# Patient Record
Sex: MALE | Race: WHITE | Employment: OTHER | URBAN - METROPOLITAN AREA
[De-identification: names, ages, dates, MRNs, and addresses within clinical notes are randomized per-mention and may not be internally consistent; named-entity substitution may affect disease eponyms.]

---

## 2020-07-20 ENCOUNTER — OFFICE VISIT (OUTPATIENT)
Dept: ORTHOPEDIC SURGERY | Age: 70
End: 2020-07-20
Payer: MEDICARE

## 2020-07-20 VITALS — HEIGHT: 69 IN | WEIGHT: 194 LBS | BODY MASS INDEX: 28.73 KG/M2

## 2020-07-20 PROCEDURE — 99203 OFFICE O/P NEW LOW 30 MIN: CPT | Performed by: PHYSICIAN ASSISTANT

## 2020-07-20 PROCEDURE — 4040F PNEUMOC VAC/ADMIN/RCVD: CPT | Performed by: PHYSICIAN ASSISTANT

## 2020-07-20 PROCEDURE — 3017F COLORECTAL CA SCREEN DOC REV: CPT | Performed by: PHYSICIAN ASSISTANT

## 2020-07-20 PROCEDURE — 1036F TOBACCO NON-USER: CPT | Performed by: PHYSICIAN ASSISTANT

## 2020-07-20 PROCEDURE — 1123F ACP DISCUSS/DSCN MKR DOCD: CPT | Performed by: PHYSICIAN ASSISTANT

## 2020-07-20 PROCEDURE — G8417 CALC BMI ABV UP PARAM F/U: HCPCS | Performed by: PHYSICIAN ASSISTANT

## 2020-07-20 PROCEDURE — G8427 DOCREV CUR MEDS BY ELIG CLIN: HCPCS | Performed by: PHYSICIAN ASSISTANT

## 2020-07-20 NOTE — PROGRESS NOTES
Signs: Ht 5' 8.5\" (1.74 m)   Wt 194 lb (88 kg)   BMI 29.07 kg/m²     General Exam:   Constitutional: Patient is adequately groomed with no evidence of malnutrition  DTRs: Deep tendon reflexes are intact  Mental Status: The patient is oriented to time, place and person. The patient's mood and affect are appropriate. Lymphatic: The lymphatic examination bilaterally reveals all areas to be without enlargement or induration. Vascular: Examination reveals no swelling or calf tenderness. Peripheral pulses are palpable and 2+. Neurological: The patient has good coordination. There is no weakness or sensory deficit. Body mass index is 29.07 kg/m². Right knee Examination:    Inspection:  No effusion but there is mild swelling, ecchymosis or deformity    Palpation:  Tenderness to palpation directly over the medial joint line but there is no palpable pain noted within the patellofemoral joint or lateral joint. Range of Motion:  Well-preserved range of motion, 0-120°. Strength: -5/5 quad and hamstring strength    Special Tests:  Positive Clayton's examination. Stable to varus and valgus stress testing. Negative Lachman's exam    Skin: There are no rashes, ulcerations or lesions. Gait: Minimal limp        Additional Examinations:         Contralateral Exam: Examination of the left knee reveals intact skin. There is no focal tenderness. The patient demonstrates full painless range of motion with regard to flexion and extension. Strength is 5/5 throughout all planes. Ligamentous stability is grossly intact. Examination of the right hip reveals intact skin. The patient demonstrates full painless range of motion with regards to flexion, abduction, internal and external rotation. There is no tenderness about the greater trochanter. There is a negative straight leg raise against resistance. Strength is 5/5 throughout all planes.     Radiology:     X-rays obtained and reviewed in office:  Views 4 views including AP weightbearing, PA flexed weightbearing, lateral, and skyline  Location right knee  Impression there is grade 2-3/4 osteoarthritis noted within the medial compartment of the right knee with grade 2/4 osteoarthritis noted within the patellofemoral joint with small peripheral osteophyte formation noted. Impression:  Encounter Diagnoses   Name Primary?  Right knee pain, unspecified chronicity Yes    Primary osteoarthritis of right knee        Office Procedures:  Orders Placed This Encounter   Procedures    XR KNEE RIGHT (MIN 4 VIEWS)     Standing Status:   Future     Number of Occurrences:   1     Standing Expiration Date:   7/17/2021    MRI KNEE RIGHT WO CONTRAST     Standing Status:   Future     Standing Expiration Date:   7/20/2021     Scheduling Instructions:      MRI RT KNEE W/O CONTRAST      MMT            SCHED:       Treatment Plan: We are going to obtain an MRI of the right knee to rule out medial meniscal tear and to evaluate his degree of osteoarthritis. If he has a significant meniscal tear we talked about arthroscopy. If there is minimal tear and its mainly arthritis he would be a candidate for Visco supplementation. We will follow-up after the MRI for review of the results.

## 2020-07-24 ENCOUNTER — HOSPITAL ENCOUNTER (OUTPATIENT)
Dept: MRI IMAGING | Age: 70
Discharge: HOME | End: 2020-07-24
Payer: MEDICARE

## 2020-07-24 PROCEDURE — 73721 MRI JNT OF LWR EXTRE W/O DYE: CPT

## 2020-07-30 ENCOUNTER — OFFICE VISIT (OUTPATIENT)
Dept: ORTHOPEDIC SURGERY | Age: 70
End: 2020-07-30
Payer: MEDICARE

## 2020-07-30 PROCEDURE — 4040F PNEUMOC VAC/ADMIN/RCVD: CPT | Performed by: ORTHOPAEDIC SURGERY

## 2020-07-30 PROCEDURE — G8417 CALC BMI ABV UP PARAM F/U: HCPCS | Performed by: ORTHOPAEDIC SURGERY

## 2020-07-30 PROCEDURE — 99214 OFFICE O/P EST MOD 30 MIN: CPT | Performed by: ORTHOPAEDIC SURGERY

## 2020-07-30 PROCEDURE — 1036F TOBACCO NON-USER: CPT | Performed by: ORTHOPAEDIC SURGERY

## 2020-07-30 PROCEDURE — 1123F ACP DISCUSS/DSCN MKR DOCD: CPT | Performed by: ORTHOPAEDIC SURGERY

## 2020-07-30 PROCEDURE — G8428 CUR MEDS NOT DOCUMENT: HCPCS | Performed by: ORTHOPAEDIC SURGERY

## 2020-07-30 PROCEDURE — 3017F COLORECTAL CA SCREEN DOC REV: CPT | Performed by: ORTHOPAEDIC SURGERY

## 2020-07-30 RX ORDER — BUPIVACAINE HYDROCHLORIDE 5 MG/ML
30 INJECTION, SOLUTION PERINEURAL ONCE
Status: COMPLETED | OUTPATIENT
Start: 2020-07-30 | End: 2020-07-30

## 2020-07-30 RX ORDER — BETAMETHASONE SODIUM PHOSPHATE AND BETAMETHASONE ACETATE 3; 3 MG/ML; MG/ML
12 INJECTION, SUSPENSION INTRA-ARTICULAR; INTRALESIONAL; INTRAMUSCULAR; SOFT TISSUE ONCE
Status: COMPLETED | OUTPATIENT
Start: 2020-07-30 | End: 2020-07-30

## 2020-07-30 RX ADMIN — BUPIVACAINE HYDROCHLORIDE 150 MG: 5 INJECTION, SOLUTION PERINEURAL at 08:46

## 2020-07-30 RX ADMIN — BETAMETHASONE SODIUM PHOSPHATE AND BETAMETHASONE ACETATE 12 MG: 3; 3 INJECTION, SUSPENSION INTRA-ARTICULAR; INTRALESIONAL; INTRAMUSCULAR; SOFT TISSUE at 08:46

## 2020-07-30 NOTE — LETTER
CONSENT TO SURGICAL OR MEDICAL PROCEDURE    PATIENTS NAMES: Brenda Mcnair 1950  79 y.o. [phone] (home)   DATE/TIME: 7/30/2020 8:49 AM    1) I consent that Dr. Corry Santos perform one or more surgical and or medical procedures on the above named patient at: [address] [place] [address]/[facility] to treat the condition(s) which appear indicated by the diagnostic studies already preformed. I have been told in general terms the nature and purpose of the procedure(s) and what the procedure(s) is/are expected to accomplish. They procedure(s) are as follows:   RT KNEE ARTHROSCOPY; PARTIAL MEDIAL MENISECTOMY; CHONDROPLAST; LATERAL WORK AS NEEDED    2) It has been explained to me by the informing physician that during the course of any surgical or medical procedure unforeseen condition(s) may be revealed that necessitate an extension of the original procedure(s) or a different procedure(s) than set forth in Paragraph 1. I therefore consent that the above named physician perform such additional or different procedure(s) as are necessary or desirable in the exercise of his professional judgement. 3) I have been made aware by the informing physician of certain reasonably known risks that are associated with the procedure(s) set forth in Paragraph 1.  I understand the reasonably known risk to be: Including but not limited to: CVA, infection, M.I., Phlebitis, Cardiac Arrest and Pulmonary Embolism, Loss of Circulation, Nerve Injury, Delayed Healing, Recurrence, Loss of extremity/digit, R.S.D., Screw breakage, Arthritis, Pain, Swelling, Stiffness, Failure of Prothesis, Fracture, Leg length discrepancy, Wound complication/non-healing, need for further surgery and persistent pain. 4) I have also been informed by the informing physician that there are other risks, from both known and unknown causes, that are attendant to the performance of any surgical or medical procedure(s). I am aware that the practice of surgery and medicine is not an exact science, and I acknowledge that no guarantees have been made to me concerning the results of the procedure(s). 5) I consent to the taking of photographs before, during and after the procedure(s) for scientific and educational purposes. I also understand that medical students and residents may participate in the procedure(s) set forth in Paragraph 1, and I consent to their participation under the supervision of the above named physician. 6) I consent to the administration of anesthesia and to the use of such anesthetics as may be deemed advisable by the anesthesiologist engaged to administer anesthesia.   7) I certify that I have read and understand this consent to the surgical or medical procedure(s); that all the information contained herein was disclosed to me by the informing physician prior to my signing; that all blanks or statements requiring insertions or completion were filled in and inapplicable paragraphs, if any, were stricken before I signed; and that all questions asked by me about the procedure(s) have been fully answered by the informing physician in a satisfactory manner.    ________________________________                           _______________________________  Signature of patient                                                                  Jennifer Arriaza M.D.  ________________________________                           ________________________________  Signature of Informing Physician                                           Informing Physician (Print)

## 2020-07-30 NOTE — PROGRESS NOTES
Chief Complaint    Results (mri rt knee)      History of Present Illness:  Neva Trihn is a 79 y.o. male who returns today for a follow-up evaluation regarding his right knee pain. He still continues report intermittent pain throughout his right knee. Typically tends to be worse with activity. We did do an MRI of the right knee is in today for the results. The results of the MRI do indicate that he has a complex tear in the posterior horn of the medial meniscus but he also additionally has moderate to severe osteoarthritis of the medial compartment. Additional arthritis is evident of the patellofemoral joint and lateral compartment. Pain Assessment  Location of Pain: Knee  Location Modifiers: Right  Severity of Pain: 1  Frequency of Pain: Intermittent  Aggravating Factors: Walking, Standing  Limiting Behavior: Some  Work-Related Injury: No  Are there other pain locations you wish to document?: No    Medical History:  Patient's medications, allergies, past medical, surgical, social and family histories were reviewed and updated as appropriate. Review of Systems:  Relevant review of systems reviewed and available in the patient's chart    Vital Signs: There were no vitals taken for this visit. General Exam:   Constitutional: Patient is adequately groomed with no evidence of malnutrition  DTRs: Deep tendon reflexes are intact  Mental Status: The patient is oriented to time, place and person. The patient's mood and affect are appropriate. Lymphatic: The lymphatic examination bilaterally reveals all areas to be without enlargement or induration. Vascular: Examination reveals no swelling or calf tenderness. Peripheral pulses are palpable and 2+. Neurological: The patient has good coordination. There is no weakness or sensory deficit. There is no height or weight on file to calculate BMI.     Right knee Examination:    Inspection:  No effusion but there is mild swelling, ecchymosis or deformity    Palpation:  Tenderness to palpation directly over the medial joint line but there is no palpable pain noted within the patellofemoral joint or lateral joint. Range of Motion:  Well-preserved range of motion, 0-120°. Strength: -5/5 quad and hamstring strength    Special Tests:  Positive Clayton's examination. Stable to varus and valgus stress testing. Negative Lachman's exam    Skin: There are no rashes, ulcerations or lesions. Gait: Minimal limp        Additional Examinations:         Contralateral Exam: Examination of the left knee reveals intact skin. There is no focal tenderness. The patient demonstrates full painless range of motion with regard to flexion and extension. Strength is 5/5 throughout all planes. Ligamentous stability is grossly intact. Radiology:     FINDINGS:    MENISCI: Lateral meniscus demonstrates normal morphology and signal    characteristics.  No lateral meniscus tear.         Complex tearing involving the superior articular surface in the posterior    horn and body of the medial meniscus.         CRUCIATE LIGAMENTS: Anterior and posterior cruciate ligaments appear intact.     ACL ganglion formation.         EXTENSOR MECHANISM: Distal quadriceps tendon, patellar tendon, and patellar    retinacula appear intact.         LATERAL COLLATERAL LIGAMENT COMPLEX: Popliteus muscle/tendon, iliotibial    band, lateral collateral ligament, and biceps femoris appear intact.         MEDIAL COLLATERAL LIGAMENT COMPLEX: Periligamentous edema with interstitial    increased T2 signal of the medial collateral ligament near the femoral    attachment.         KNEE JOINT: Moderate joint effusion.         Mild tricompartmental osteophyte spurring.         Osseous alignment is normal.         No acute fracture or dislocation.         Diffuse grade 3-4 medial compartment chondromalacia with underlying    subchondral reactive marrow changes primarily at the medial femoral condyle.      Grade 2 lateral compartment chondromalacia.         Diffuse grade 2-3 patellofemoral chondromalacia with multifocal partial and    full-thickness fissuring of the patellofemoral articular cartilage.         BONE MARROW: Bone marrow signal intensity within the remaining visualized    osseous structures otherwise grossly unremarkable.         SOFT TISSUES: Small ruptured Baker's cyst.  Mild-to-moderate edema in the    subcutaneous fat about the knee.  Visualized popliteal neurovascular bundle    grossly unremarkable.              Impression    1. Complex tear involving the superior articular surface in the posterior    horn and body of the medial meniscus. 2. Tricompartmental osteoarthrosis.  Moderate-to-severe medial compartment    chondromalacia with underlying subchondral reactive marrow change. Mild-to-moderate patellofemoral chondromalacia with superimposed multifocal    partial and full-thickness fissuring.  Mild lateral compartment    chondromalacia. 3. Small Baker's cyst.  Moderate joint effusion.  Mild-to-moderate edema in    the subcutaneous fat about the knee. 4. Grade 1-2 MCL sprain versus chronic low-grade interstitial tear and    reactive edema about the medial collateral ligament. 5. ACL ganglion formation. Impression:  Encounter Diagnoses   Name Primary?  Primary osteoarthritis of right knee Yes    Acute medial meniscal tear, initial encounter        Office Procedures:  No orders of the defined types were placed in this encounter. Procedure: Right knee cortisone injection  I discussed in detail the risks, benefits and complications of aninjection which included but are not limited to infection, skin reactions, hot swollen joint, and anaphylaxis with the patient. The patient verbalized understanding and gave informed consent for the right knee injection.  The patient's knee was slightly flexed with a bolster underneath the knee and the skin prepped using Betadine or normal  rehabilitation  that   is involved with weeks of physical therapy, exercise, and strengthening. The patient also realizes that even though the surgery, from a functional perspective, typically allows the patient to return to good function at about 6 weeks, that it often takes 6 months to completely rehabilitate from this operation. The patient also realizes that if there is an arthritic component to the symptoms, then they may still have some degree of arthritis pain.

## 2020-08-27 ENCOUNTER — VIRTUAL VISIT (OUTPATIENT)
Dept: ORTHOPEDIC SURGERY | Age: 70
End: 2020-08-27
Payer: MEDICARE

## 2020-08-27 PROCEDURE — G8417 CALC BMI ABV UP PARAM F/U: HCPCS | Performed by: PHYSICIAN ASSISTANT

## 2020-08-27 PROCEDURE — 4040F PNEUMOC VAC/ADMIN/RCVD: CPT | Performed by: PHYSICIAN ASSISTANT

## 2020-08-27 PROCEDURE — G8428 CUR MEDS NOT DOCUMENT: HCPCS | Performed by: PHYSICIAN ASSISTANT

## 2020-08-27 PROCEDURE — 99213 OFFICE O/P EST LOW 20 MIN: CPT | Performed by: PHYSICIAN ASSISTANT

## 2020-08-27 PROCEDURE — 1123F ACP DISCUSS/DSCN MKR DOCD: CPT | Performed by: PHYSICIAN ASSISTANT

## 2020-08-27 PROCEDURE — 3017F COLORECTAL CA SCREEN DOC REV: CPT | Performed by: PHYSICIAN ASSISTANT

## 2020-08-27 PROCEDURE — 1036F TOBACCO NON-USER: CPT | Performed by: PHYSICIAN ASSISTANT

## 2020-08-27 NOTE — PROGRESS NOTES
Narayan Olivera is a 79 y.o. male being evaluated by a Virtual Visit (video visit) encounter to address concerns as mentioned above. A caregiver was present when appropriate. Due to this being a TeleHealth encounter (During [facility]P-83 public health emergency), evaluation of the following organ systems was limited: Vitals/Constitutional/EENT/Resp/CV/GI//MS/Neuro/Skin/Heme-Lymph-Imm. Pursuant to the emergency declaration under the [address] [place] [address] and the [employer] Act, this Virtual Visit was conducted with patient's (and/or legal guardian's) consent, to reduce the patient's risk of exposure to COVID-19 and provide necessary medical care. The patient (and/or legal guardian) has also been advised to contact this office for worsening conditions or problems, and seek emergency medical treatment and/or call 911 if deemed necessary. Services were provided through a video synchronous discussion virtually to substitute for in-person clinic visit. Patient's location was at their home. --Beto Tobar PA-C on 8/27/2020 at 11:35 AM    Chief Complaint    Follow-up (RIGHT KNEE)      History of Present Illness:  Narayan Olivera is a 79 y.o. male presents for his virtual visit. Patient at his last visit was diagnosed with a right knee medial meniscus tear. He also had advanced arthritis concentrated over the medial compartment. We did give him a cortisone injection. He states that he has very little pain at this time and his swelling has improved. To date he is happy with his progress. He has not yet returned to normal activities. Patient does state that he does get some mild irritation of swelling with activities but it is improved by rest and use of ice.            Medical History:  Past Medical History:   Diagnosis Date    Arthritis      Patient Active Problem List    Diagnosis Date Noted    Primary localized osteoarthrosis, upper arm 02/09/2015    Pain in joint, upper arm 07/31/2014    Other orthopedic aftercare 09/27/2013    Sprain and strain of unspecified site of shoulder and upper arm 09/27/2013    Brachial neuritis or radiculitis 09/27/2013     Past Surgical History:   Procedure Laterality Date    COLONOSCOPY  1/6/2014    polyp    ELBOW ARTHROSCOPY      left    INGUINAL HERNIA REPAIR Left 12-3-14    KNEE ARTHROSCOPY      right    SHOULDER ARTHROSCOPY      right and left     Family History   Problem Relation Age of Onset    Cancer Mother         breast    Cancer Father         lung     Social History     Socioeconomic History    Marital status:      Spouse name: Not on file    Number of children: Not on file    Years of education: Not on file    Highest education level: Not on file   Occupational History    Not on file   Social Needs    Financial resource strain: Not on file    Food insecurity     Worry: Not on file     Inability: Not on file    Transportation needs     Medical: Not on file     Non-medical: Not on file   Tobacco Use    Smoking status: Never Smoker    Smokeless tobacco: Never Used   Substance and Sexual Activity    Alcohol use: Yes     Comment: 7/week    Drug use: Not on file    Sexual activity: Not on file   Lifestyle    Physical activity     Days per week: Not on file     Minutes per session: Not on file    Stress: Not on file   Relationships    Social connections     Talks on phone: Not on file     Gets together: Not on file     Attends Anabaptism service: Not on file     Active member of club or organization: Not on file     Attends meetings of clubs or organizations: Not on file     Relationship status: Not on file    Intimate partner violence     Fear of current or ex partner: Not on file     Emotionally abused: Not on file     Physically abused: Not on file     Forced sexual activity: Not on file   Other Topics Concern    Not on file arthritis and the various options that are involved with this. The patient understands that the treatments can vary from essentially doing nothing to a total joint replacement arthroplasty for arthritis. I then went on to describe the utilization of glucosamine and chondroitin sulfate as a joint nutrition product. We talked about the fact that this is essentially a joint vitamin with typically minimal side effects. We also talked about utilization of prescription over-the-counter anti-inflammatory medications as the next option. We also discussed the possibility of brace wear or orthotic wear if the patient has significant varus alignment. We then went on to discuss the possibility of Visco supplementation with hyaluronate products. We talked about the typical course of this type of treatment and the fact that often times in the treatment for significant arthritis, this is successful less than half the time. We also talked about the corticosteroid injections and the fact that this can give a brief window of relief, but does not cure the problem; in fact, the pain often has a rebound effect in 6-10 weeks after the steroid has worn off. We also discussed arthroscopy surgery in attempts to debride the joint, but the fact that this is relatively unreliable treatment in the face of significant arthritis. It can occasionally be used, particularly if there is significant meniscus pathology. Lastly we discussed total joint replacement arthroplasty as the final and definitive step in treatment of arthritis. Patient realizes the magnitude of this type of treatment as well as having voiced a general understanding to the duration of the prosthesis. The patient voiced understanding to these continuum of treatment options. Patient will return to normal activities. If his knee continues to do well he can follow-up on a as needed basis.   If his symptoms return he would be a good candidate for a right knee video arthroscopy with partial meniscectomy and chondroplasty to be followed by viscosupplementation injections.

## 2020-09-02 NOTE — PROGRESS NOTES
Michail Peoples    Age 79 y.o.    male    1950    MRN [id number]    9/11/2020  Arrival Time_____________  OR Time____________[address]     Procedure(s):  RIGHT KNEE VIDEO ARTHROSCOPY PARTIAL MEDIAL MENISCECTOMY CHONDROPLASTY LATERAL WORK AS NEEDED                      General    Surgeon(s):  Aldair Ross, MD       Phone [phone] ([place])     [address]  Cell Work  _________________________________________________________________  _________________________________________________________________  _________________________________________________________________  _________________________________________________________________  _________________________________________________________________      PCP _____________________________ Phone_________________       H&P__________________Bringing    Chart            Epic  DOS     Called_______  EKG__________________Bringing    Chart            Epic  DOS     Called_______  LAB__________________ Bringing    Chart            Epic  DOS     Called_______  Cardiac Clearance_______Bringing    Chart            Epic      DOS       Called_______    Cardiologist________________________ Phone___________________________      ? Hindu concerns / Waiver on Chart            PAT Communications_____________  ? Pre-op Instructions Given [place]          ______________________________  ? Directions to Surgery Center                          ______________________________  ? Transportation Home_______________      _______________________________  ?  Crutches/Walker__________________        _______________________________      ________Pre-op Orders   _______Transcribed    _______Wt.  ________Pharmacy          _______SCD  ______VTE     ______Beta Blocker  ________Consent             ________TED Luis Spotted

## 2020-09-04 RX ORDER — ACETAMINOPHEN 160 MG
TABLET,DISINTEGRATING ORAL DAILY
COMMUNITY

## 2020-09-04 NOTE — PROGRESS NOTES
Obstructive Sleep Apnea (DAIJA) Screening     Patient:  Isreal Vee    YOB: 1950      Medical Record #:  [id number]                     Date:  9/4/2020     1. Are you a loud and/or regular snorer? []  Yes       [x] No    2. Have you been observed to gasp or stop breathing during sleep? []  Yes       [x] No    3. Do you feel tired or groggy upon awakening or do you awaken with a headache?           []  Yes       [x] No    4. Are you often tired or fatigued during the wake time hours? []  Yes       [x] No    5. Do you fall asleep sitting, reading, watching TV or driving? []  Yes       [x] No    6. Do you often have problems with memory or concentration? []  Yes       [x] No    **If patient's score is ? 3 they are considered high risk for DAIJA. An Anesthesia provider will evaluate the patient and develop a plan of care the day of surgery. Note:  If the patient's BMI is more than 35 kg m¯² , has neck circumference > 40 cm, and/or high blood pressure the risk is greater (© American Sleep Apnea Association, 2006).

## 2020-09-07 ENCOUNTER — OFFICE VISIT (OUTPATIENT)
Dept: PRIMARY CARE CLINIC | Age: 70
End: 2020-09-07
Payer: MEDICARE

## 2020-09-07 PROCEDURE — 99211 OFF/OP EST MAY X REQ PHY/QHP: CPT | Performed by: NURSE PRACTITIONER

## 2020-09-07 PROCEDURE — G8417 CALC BMI ABV UP PARAM F/U: HCPCS | Performed by: NURSE PRACTITIONER

## 2020-09-07 PROCEDURE — G8428 CUR MEDS NOT DOCUMENT: HCPCS | Performed by: NURSE PRACTITIONER

## 2020-09-07 NOTE — PATIENT INSTRUCTIONS
Advance Care Planning  People with COVID-19 may have no symptoms, mild symptoms, such as fever, cough, and shortness of breath or they may have more severe illness, developing severe and fatal pneumonia. As a result, Advance Care Planning with attention to naming a health care decision maker (someone you trust to make healthcare decisions for you if you could not speak for yourself) and sharing other health care preferences is important BEFORE a possible health crisis. Please contact your Primary Care Provider to discuss Advance Care Planning. Preventing the Spread of Coronavirus Disease 2019 in Homes and Residential Communities  For the most recent information go to [email or web address]    Prevention steps for People with confirmed or suspected COVID-19 (including persons under investigation) who do not need to be hospitalized  and   People with confirmed COVID-19 who were hospitalized and determined to be medically stable to go home    Your healthcare provider and public health staff will evaluate whether you can be cared for at home. If it is determined that you do not need to be hospitalized and can be isolated at home, you will be monitored by staff from your local or state health department. You should follow the prevention steps below until a healthcare provider or local or state health department says you can return to your normal activities. Stay home except to get medical care  People who are mildly ill with COVID-19 are able to isolate at home during their illness. You should restrict activities outside your home, except for getting medical care. Do not go to work, school, or public areas. Avoid using public transportation, ride-sharing, or taxis. Separate yourself from other people and animals in your home  People: As much as possible, you should stay in a specific room and away from other people in your home.  Also, you should use a separate before eating or preparing food. If soap and water are not readily available, use an alcohol-based hand  with at least 60% alcohol, covering all surfaces of your hands and rubbing them together until they feel dry. Soap and water are the best option if hands are visibly dirty. Avoid touching your eyes, nose, and mouth with unwashed hands. Avoid sharing personal household items  You should not share dishes, drinking glasses, cups, eating utensils, towels, or bedding with other people or pets in your home. After using these items, they should be washed thoroughly with soap and water. Clean all high-touch surfaces everyday  High touch surfaces include counters, tabletops, doorknobs, bathroom fixtures, toilets, phones, keyboards, tablets, and bedside tables. Also, clean any surfaces that may have blood, stool, or body fluids on them. Use a household cleaning spray or wipe, according to the label instructions. Labels contain instructions for safe and effective use of the cleaning product including precautions you should take when applying the product, such as wearing gloves and making sure you have good ventilation during use of the product. Monitor your symptoms  Seek prompt medical attention if your illness is worsening (e.g., difficulty breathing). Before seeking care, call your healthcare provider and tell them that you have, or are being evaluated for, COVID-19. Put on a facemask before you enter the facility. These steps will help the healthcare providers office to keep other people in the office or waiting room from getting infected or exposed. Ask your healthcare provider to call the local or state health department. Persons who are placed under active monitoring or facilitated self-monitoring should follow instructions provided by their local health department or occupational health professionals, as appropriate. When working with your local health department check their available hours.   If you have a medical emergency and need to call 911, notify the dispatch personnel that you have, or are being evaluated for COVID-19. If possible, put on a facemask before emergency medical services arrive. Discontinuing home isolation  Patients with confirmed COVID-19 should remain under home isolation precautions until the risk of secondary transmission to others is thought to be low. The decision to discontinue home isolation precautions should be made on a case-by-case basis, in consultation with healthcare providers and state and local health departments.

## 2020-09-07 NOTE — PROGRESS NOTES
Callie Martin received a viral test for COVID-19. They were educated on isolation and quarantine as appropriate. For any symptoms, they were directed to seek care from their PCP, given contact information to establish with a doctor, directed to an urgent care or the emergency room.

## 2020-09-08 LAB — SARS-COV-2, NAA: NOT DETECTED

## 2020-09-10 ENCOUNTER — ANESTHESIA EVENT (OUTPATIENT)
Dept: OPERATING ROOM | Age: 70
End: 2020-09-10
Payer: MEDICARE

## 2020-09-11 ENCOUNTER — ANESTHESIA (OUTPATIENT)
Dept: OPERATING ROOM | Age: 70
End: 2020-09-11
Payer: MEDICARE

## 2020-09-11 ENCOUNTER — HOSPITAL ENCOUNTER (OUTPATIENT)
Age: 70
Setting detail: OUTPATIENT SURGERY
Discharge: HOME | End: 2020-09-11
Attending: ORTHOPAEDIC SURGERY | Admitting: ORTHOPAEDIC SURGERY
Payer: MEDICARE

## 2020-09-11 VITALS
HEART RATE: 55 BPM | WEIGHT: 194 LBS | OXYGEN SATURATION: 97 % | SYSTOLIC BLOOD PRESSURE: 134 MMHG | TEMPERATURE: 97.6 F | DIASTOLIC BLOOD PRESSURE: 86 MMHG | HEIGHT: 68 IN | BODY MASS INDEX: 29.4 KG/M2 | RESPIRATION RATE: 16 BRPM

## 2020-09-11 VITALS
SYSTOLIC BLOOD PRESSURE: 88 MMHG | DIASTOLIC BLOOD PRESSURE: 52 MMHG | OXYGEN SATURATION: 97 % | RESPIRATION RATE: 2 BRPM

## 2020-09-11 PROCEDURE — 6360000002 HC RX W HCPCS: Performed by: NURSE ANESTHETIST, CERTIFIED REGISTERED

## 2020-09-11 PROCEDURE — 7100000000 HC PACU RECOVERY - FIRST 15 MIN: Performed by: ORTHOPAEDIC SURGERY

## 2020-09-11 PROCEDURE — 6360000002 HC RX W HCPCS: Performed by: ORTHOPAEDIC SURGERY

## 2020-09-11 PROCEDURE — 7100000001 HC PACU RECOVERY - ADDTL 15 MIN: Performed by: ORTHOPAEDIC SURGERY

## 2020-09-11 PROCEDURE — 2500000003 HC RX 250 WO HCPCS: Performed by: ORTHOPAEDIC SURGERY

## 2020-09-11 PROCEDURE — 6370000000 HC RX 637 (ALT 250 FOR IP): Performed by: ANESTHESIOLOGY

## 2020-09-11 PROCEDURE — 3700000001 HC ADD 15 MINUTES (ANESTHESIA): Performed by: ORTHOPAEDIC SURGERY

## 2020-09-11 PROCEDURE — 7100000010 HC PHASE II RECOVERY - FIRST 15 MIN: Performed by: ORTHOPAEDIC SURGERY

## 2020-09-11 PROCEDURE — 2580000003 HC RX 258: Performed by: ORTHOPAEDIC SURGERY

## 2020-09-11 PROCEDURE — 2720000010 HC SURG SUPPLY STERILE: Performed by: ORTHOPAEDIC SURGERY

## 2020-09-11 PROCEDURE — 2500000003 HC RX 250 WO HCPCS: Performed by: NURSE ANESTHETIST, CERTIFIED REGISTERED

## 2020-09-11 PROCEDURE — 3600000014 HC SURGERY LEVEL 4 ADDTL 15MIN: Performed by: ORTHOPAEDIC SURGERY

## 2020-09-11 PROCEDURE — 3600000004 HC SURGERY LEVEL 4 BASE: Performed by: ORTHOPAEDIC SURGERY

## 2020-09-11 PROCEDURE — 3700000000 HC ANESTHESIA ATTENDED CARE: Performed by: ORTHOPAEDIC SURGERY

## 2020-09-11 PROCEDURE — 7100000011 HC PHASE II RECOVERY - ADDTL 15 MIN: Performed by: ORTHOPAEDIC SURGERY

## 2020-09-11 PROCEDURE — 2580000003 HC RX 258: Performed by: ANESTHESIOLOGY

## 2020-09-11 PROCEDURE — 2709999900 HC NON-CHARGEABLE SUPPLY: Performed by: ORTHOPAEDIC SURGERY

## 2020-09-11 RX ORDER — HYDRALAZINE HYDROCHLORIDE 20 MG/ML
5 INJECTION INTRAMUSCULAR; INTRAVENOUS EVERY 10 MIN PRN
Status: DISCONTINUED | OUTPATIENT
Start: 2020-09-11 | End: 2020-09-11 | Stop reason: HOSPADM

## 2020-09-11 RX ORDER — LIDOCAINE HYDROCHLORIDE 10 MG/ML
INJECTION, SOLUTION INFILTRATION; PERINEURAL PRN
Status: DISCONTINUED | OUTPATIENT
Start: 2020-09-11 | End: 2020-09-11 | Stop reason: SDUPTHER

## 2020-09-11 RX ORDER — MEPERIDINE HYDROCHLORIDE 50 MG/ML
12.5 INJECTION INTRAMUSCULAR; INTRAVENOUS; SUBCUTANEOUS EVERY 5 MIN PRN
Status: DISCONTINUED | OUTPATIENT
Start: 2020-09-11 | End: 2020-09-11 | Stop reason: HOSPADM

## 2020-09-11 RX ORDER — SODIUM CHLORIDE, SODIUM LACTATE, POTASSIUM CHLORIDE, CALCIUM CHLORIDE 600; 310; 30; 20 MG/100ML; MG/100ML; MG/100ML; MG/100ML
INJECTION, SOLUTION INTRAVENOUS CONTINUOUS
Status: DISCONTINUED | OUTPATIENT
Start: 2020-09-11 | End: 2020-09-11 | Stop reason: HOSPADM

## 2020-09-11 RX ORDER — MORPHINE SULFATE 2 MG/ML
2 INJECTION, SOLUTION INTRAMUSCULAR; INTRAVENOUS EVERY 5 MIN PRN
Status: DISCONTINUED | OUTPATIENT
Start: 2020-09-11 | End: 2020-09-11 | Stop reason: HOSPADM

## 2020-09-11 RX ORDER — LIDOCAINE HYDROCHLORIDE 10 MG/ML
0.3 INJECTION, SOLUTION EPIDURAL; INFILTRATION; INTRACAUDAL; PERINEURAL
Status: DISCONTINUED | OUTPATIENT
Start: 2020-09-11 | End: 2020-09-11 | Stop reason: HOSPADM

## 2020-09-11 RX ORDER — OXYCODONE HYDROCHLORIDE AND ACETAMINOPHEN 5; 325 MG/1; MG/1
2 TABLET ORAL PRN
Status: COMPLETED | OUTPATIENT
Start: 2020-09-11 | End: 2020-09-11

## 2020-09-11 RX ORDER — SODIUM CHLORIDE, SODIUM LACTATE, POTASSIUM CHLORIDE, AND CALCIUM CHLORIDE .6; .31; .03; .02 G/100ML; G/100ML; G/100ML; G/100ML
IRRIGANT IRRIGATION PRN
Status: DISCONTINUED | OUTPATIENT
Start: 2020-09-11 | End: 2020-09-11 | Stop reason: ALTCHOICE

## 2020-09-11 RX ORDER — SODIUM CHLORIDE 0.9 % (FLUSH) 0.9 %
10 SYRINGE (ML) INJECTION PRN
Status: DISCONTINUED | OUTPATIENT
Start: 2020-09-11 | End: 2020-09-11 | Stop reason: HOSPADM

## 2020-09-11 RX ORDER — OXYCODONE HYDROCHLORIDE AND ACETAMINOPHEN 5; 325 MG/1; MG/1
1 TABLET ORAL EVERY 6 HOURS PRN
Qty: 10 TABLET | Refills: 0 | Status: SHIPPED | OUTPATIENT
Start: 2020-09-11 | End: 2020-09-18

## 2020-09-11 RX ORDER — ONDANSETRON 2 MG/ML
INJECTION INTRAMUSCULAR; INTRAVENOUS PRN
Status: DISCONTINUED | OUTPATIENT
Start: 2020-09-11 | End: 2020-09-11 | Stop reason: SDUPTHER

## 2020-09-11 RX ORDER — MORPHINE SULFATE 2 MG/ML
1 INJECTION, SOLUTION INTRAMUSCULAR; INTRAVENOUS EVERY 5 MIN PRN
Status: DISCONTINUED | OUTPATIENT
Start: 2020-09-11 | End: 2020-09-11 | Stop reason: HOSPADM

## 2020-09-11 RX ORDER — SODIUM CHLORIDE 0.9 % (FLUSH) 0.9 %
10 SYRINGE (ML) INJECTION EVERY 12 HOURS SCHEDULED
Status: DISCONTINUED | OUTPATIENT
Start: 2020-09-11 | End: 2020-09-11 | Stop reason: HOSPADM

## 2020-09-11 RX ORDER — OXYCODONE HYDROCHLORIDE AND ACETAMINOPHEN 5; 325 MG/1; MG/1
1 TABLET ORAL PRN
Status: COMPLETED | OUTPATIENT
Start: 2020-09-11 | End: 2020-09-11

## 2020-09-11 RX ORDER — DEXAMETHASONE SODIUM PHOSPHATE 4 MG/ML
INJECTION, SOLUTION INTRA-ARTICULAR; INTRALESIONAL; INTRAMUSCULAR; INTRAVENOUS; SOFT TISSUE PRN
Status: DISCONTINUED | OUTPATIENT
Start: 2020-09-11 | End: 2020-09-11 | Stop reason: SDUPTHER

## 2020-09-11 RX ORDER — PROMETHAZINE HYDROCHLORIDE 25 MG/ML
6.25 INJECTION, SOLUTION INTRAMUSCULAR; INTRAVENOUS
Status: DISCONTINUED | OUTPATIENT
Start: 2020-09-11 | End: 2020-09-11 | Stop reason: HOSPADM

## 2020-09-11 RX ORDER — BUPIVACAINE HYDROCHLORIDE 2.5 MG/ML
INJECTION, SOLUTION INFILTRATION; PERINEURAL PRN
Status: DISCONTINUED | OUTPATIENT
Start: 2020-09-11 | End: 2020-09-11 | Stop reason: ALTCHOICE

## 2020-09-11 RX ORDER — LABETALOL HYDROCHLORIDE 5 MG/ML
5 INJECTION, SOLUTION INTRAVENOUS EVERY 10 MIN PRN
Status: DISCONTINUED | OUTPATIENT
Start: 2020-09-11 | End: 2020-09-11 | Stop reason: HOSPADM

## 2020-09-11 RX ORDER — ONDANSETRON 2 MG/ML
4 INJECTION INTRAMUSCULAR; INTRAVENOUS PRN
Status: DISCONTINUED | OUTPATIENT
Start: 2020-09-11 | End: 2020-09-11 | Stop reason: HOSPADM

## 2020-09-11 RX ORDER — PROPOFOL 10 MG/ML
INJECTION, EMULSION INTRAVENOUS PRN
Status: DISCONTINUED | OUTPATIENT
Start: 2020-09-11 | End: 2020-09-11 | Stop reason: SDUPTHER

## 2020-09-11 RX ORDER — FENTANYL CITRATE 50 UG/ML
INJECTION, SOLUTION INTRAMUSCULAR; INTRAVENOUS PRN
Status: DISCONTINUED | OUTPATIENT
Start: 2020-09-11 | End: 2020-09-11 | Stop reason: SDUPTHER

## 2020-09-11 RX ORDER — DIPHENHYDRAMINE HYDROCHLORIDE 50 MG/ML
12.5 INJECTION INTRAMUSCULAR; INTRAVENOUS
Status: DISCONTINUED | OUTPATIENT
Start: 2020-09-11 | End: 2020-09-11 | Stop reason: HOSPADM

## 2020-09-11 RX ADMIN — OXYCODONE HYDROCHLORIDE AND ACETAMINOPHEN 1 TABLET: 5; 325 TABLET ORAL at 11:37

## 2020-09-11 RX ADMIN — CEFAZOLIN SODIUM 2 G: 10 INJECTION, POWDER, FOR SOLUTION INTRAVENOUS at 10:37

## 2020-09-11 RX ADMIN — PROPOFOL 200 MG: 10 INJECTION, EMULSION INTRAVENOUS at 10:30

## 2020-09-11 RX ADMIN — FENTANYL CITRATE 50 MCG: 50 INJECTION INTRAMUSCULAR; INTRAVENOUS at 10:30

## 2020-09-11 RX ADMIN — DEXAMETHASONE SODIUM PHOSPHATE 8 MG: 4 INJECTION, SOLUTION INTRAMUSCULAR; INTRAVENOUS at 10:39

## 2020-09-11 RX ADMIN — ONDANSETRON 4 MG: 2 INJECTION INTRAMUSCULAR; INTRAVENOUS at 10:39

## 2020-09-11 RX ADMIN — LIDOCAINE HYDROCHLORIDE 40 MG: 10 INJECTION, SOLUTION INFILTRATION; PERINEURAL at 10:30

## 2020-09-11 RX ADMIN — FENTANYL CITRATE 50 MCG: 50 INJECTION INTRAMUSCULAR; INTRAVENOUS at 10:25

## 2020-09-11 RX ADMIN — SODIUM CHLORIDE, POTASSIUM CHLORIDE, SODIUM LACTATE AND CALCIUM CHLORIDE: 600; 310; 30; 20 INJECTION, SOLUTION INTRAVENOUS at 09:21

## 2020-09-11 RX ADMIN — FENTANYL CITRATE 50 MCG: 50 INJECTION INTRAMUSCULAR; INTRAVENOUS at 10:49

## 2020-09-11 ASSESSMENT — PULMONARY FUNCTION TESTS
PIF_VALUE: 4
PIF_VALUE: 11
PIF_VALUE: 2
PIF_VALUE: 3
PIF_VALUE: 11
PIF_VALUE: 3
PIF_VALUE: 3
PIF_VALUE: 0
PIF_VALUE: 3
PIF_VALUE: 2
PIF_VALUE: 2
PIF_VALUE: 1
PIF_VALUE: 10
PIF_VALUE: 3
PIF_VALUE: 10
PIF_VALUE: 2
PIF_VALUE: 2
PIF_VALUE: 1
PIF_VALUE: 2
PIF_VALUE: 3
PIF_VALUE: 2
PIF_VALUE: 10
PIF_VALUE: 2
PIF_VALUE: 10
PIF_VALUE: 3
PIF_VALUE: 0
PIF_VALUE: 3
PIF_VALUE: 11
PIF_VALUE: 3
PIF_VALUE: 2
PIF_VALUE: 3
PIF_VALUE: 2
PIF_VALUE: 3
PIF_VALUE: 5
PIF_VALUE: 2
PIF_VALUE: 2
PIF_VALUE: 1
PIF_VALUE: 2
PIF_VALUE: 3
PIF_VALUE: 15

## 2020-09-11 ASSESSMENT — PAIN - FUNCTIONAL ASSESSMENT: PAIN_FUNCTIONAL_ASSESSMENT: 0-10

## 2020-09-11 ASSESSMENT — PAIN SCALES - GENERAL
PAINLEVEL_OUTOF10: 0
PAINLEVEL_OUTOF10: 4
PAINLEVEL_OUTOF10: 0
PAINLEVEL_OUTOF10: 0

## 2020-09-11 ASSESSMENT — PAIN DESCRIPTION - LOCATION: LOCATION: KNEE

## 2020-09-11 ASSESSMENT — PAIN DESCRIPTION - PAIN TYPE: TYPE: SURGICAL PAIN

## 2020-09-11 ASSESSMENT — PAIN DESCRIPTION - DESCRIPTORS: DESCRIPTORS: ACHING

## 2020-09-11 ASSESSMENT — PAIN DESCRIPTION - ORIENTATION: ORIENTATION: RIGHT

## 2020-09-11 NOTE — BRIEF OP NOTE
Brief Postoperative Note      Patient: Laya Altamirano  YOB: 1950  MRN: [id number]    Date of Procedure: 9/11/2020    Pre-Op Diagnosis: RIGHT KNEE MEDIAL AND LATERAL MENISCUS TEARS; CHONDROMALACIA ; PLICA    Post-Op Diagnosis: Same       Procedure(s):  RIGHT KNEE VIDEO ARTHROSCOPY PARTIAL MEDIAL MENISCECTOMY CHONDROPLASTY, PILCA    Surgeon(s):  Jesus Parker MD    Assistant:  Sarah Hurtado MD    Anesthesia: General    Estimated Blood Loss (mL): Minimal    Complications: None    Specimens:   * No specimens in log *    Implants:  * No implants in log *      Drains: * No LDAs found *    Findings: AS ABOVE    Electronically signed by Jesus Parker MD on 9/11/2020 at 11:15 AM

## 2020-09-11 NOTE — ANESTHESIA POSTPROCEDURE EVALUATION
Department of Anesthesiology  Postprocedure Note    Patient: Brenda Mcnair  MRN: [id number]  YOB: 1950  Date of evaluation: 9/11/2020  Time:  1:27 PM     Procedure Summary     Date:  09/11/20 Room / Location:  [place] [address] / [facility]    Anesthesia Start:  1775 Anesthesia Stop:  2935    Procedure:  RIGHT KNEE VIDEO ARTHROSCOPY PARTIAL MEDIAL MENISCECTOMY CHONDROPLASTY, PILCA (Right Knee) Diagnosis:       Acute medial meniscus tear of right knee, initial encounter      (RIGHT KNEE MENISCUS TEAR)    Surgeon:  Corry Santos MD Responsible Provider:  Elle Reyes MD    Anesthesia Type:  general ASA Status:  2          Anesthesia Type: general    Simeon Phase I: Simeon Score: 9    Simeon Phase II: Simeon Score: 10    Last vitals: Reviewed and per EMR flowsheets.        Anesthesia Post Evaluation    Patient location during evaluation: PACU  Patient participation: complete - patient participated  Level of consciousness: awake and alert  Pain score: 0  Airway patency: patent  Nausea & Vomiting: no nausea and no vomiting  Complications: no  Cardiovascular status: blood pressure returned to baseline  Respiratory status: acceptable  Hydration status: stable

## 2020-09-11 NOTE — ANESTHESIA PRE PROCEDURE
Department of Anesthesiology  Preprocedure Note       Name:  Brenda Mcnair   Age:  79 y.o.  :  1950                                          MRN:  [id number]         Date:  2020      Surgeon: Sola Shaw):  Corry Santos MD    Procedure: Procedure(s):  RIGHT KNEE VIDEO ARTHROSCOPY PARTIAL MEDIAL MENISCECTOMY CHONDROPLASTY LATERAL WORK AS NEEDED    Medications prior to admission:   Prior to Admission medications    Medication Sig Start Date End Date Taking? Authorizing Provider   oxyCODONE-acetaminophen (PERCOCET) 5-325 MG per tablet Take 1 tablet by mouth every 6 hours as needed for Pain for up to 7 days. Intended supply: 7 days. Take lowest dose possible to manage pain 20 Yes Corry Santos MD   Cholecalciferol (VITAMIN D3) 50 MCG ( UT) CAPS Take by mouth daily 1-2 tabs   Yes Historical Provider, MD   multivitamin plus iron (ANIMAL SHAPES) 15 MG chewable tablet Take 1 tablet by mouth daily. Yes Historical Provider, MD   Misc Natural Products (GLUCOSAMINE-CHONDROITIN DS) TABS Take by mouth as needed     Historical Provider, MD   Omega-3 Fatty Acids (FISH OIL) 1200 MG CAPS Take by mouth as needed     Historical Provider, MD       Current medications:    Current Facility-Administered Medications   Medication Dose Route Frequency Provider Last Rate Last Dose    ceFAZolin (ANCEF) 2 g in dextrose 5 % 100 mL IVPB  2 g Intravenous On Call to [address], MD        lactated ringers infusion   Intravenous Continuous Sandra Fonseca  mL/hr at 20 0921      sodium chloride flush 0.9 % injection 10 mL  10 mL Intravenous 2 times per day Sandra Fonseca MD        sodium chloride flush 0.9 % injection 10 mL  10 mL Intravenous PRN Sandra Fonseca MD        lidocaine PF 1 % injection 0.3 mL  0.3 mL Intradermal Once PRN Sandra Fonseca MD           Allergies:     Allergies   Allergen Reactions    Demerol Hcl [Meperidine] Nausea Only       Problem List:    Patient Active Problem List Diagnosis Code    Other orthopedic aftercare Z47.89    Sprain and strain of unspecified site of shoulder and upper arm [id number]    Brachial neuritis or radiculitis M54.12    Pain in joint, upper arm M25.529    Primary localized osteoarthrosis, upper arm M19.029       Past Medical History:        Diagnosis Date    Arthritis        Past Surgical History:        Procedure Laterality Date    COLONOSCOPY  1/6/2014    polyp    ELBOW ARTHROSCOPY      left    INGUINAL HERNIA REPAIR Left 12-3-14    KNEE ARTHROSCOPY      right    SHOULDER ARTHROSCOPY      right and left       Social History:    Social History     Tobacco Use    Smoking status: Never Smoker    Smokeless tobacco: Never Used   Substance Use Topics    Alcohol use: Yes     Alcohol/week: 7.0 standard drinks     Types: 7 Glasses of wine per week                                Counseling given: Not Answered      Vital Signs (Current):   Vitals:    09/04/20 0911 09/11/20 0916   BP:  133/86   Pulse:  53   Resp:  16   Temp:  97 °F (36.1 °C)   SpO2:  98%   Weight: 194 lb (88 kg) 194 lb (88 kg)   Height: 5' 8.5\" (1.74 m) 5' 8\" (1.727 m)                                              BP Readings from Last 3 Encounters:   09/11/20 133/86   02/09/15 114/75   01/20/15 128/76       NPO Status: Time of last liquid consumption: 2300                        Time of last solid consumption: 2300                        Date of last liquid consumption: 09/10/20                        Date of last solid food consumption: 09/10/20    BMI:   Wt Readings from Last 3 Encounters:   09/11/20 194 lb (88 kg)   07/20/20 194 lb (88 kg)   02/09/15 195 lb (88.5 kg)     Body mass index is 29.5 kg/m².     CBC:   Lab Results   Component Value Date    WBC 8.0 12/03/2014    RBC 4.48 12/03/2014    HGB 14.2 12/03/2014    HCT 42.0 12/03/2014    MCV 93.7 12/03/2014    RDW 13.6 12/03/2014     12/03/2014       CMP:   Lab Results   Component Value Date     12/03/2014    K 3.6 12/03/2014     12/03/2014    CO2 24 12/03/2014    BUN 16 12/03/2014    CREATININE 0.9 12/03/2014    GFRAA >60 12/03/2014    GFRAA >60 08/21/2010    LABGLOM >60 12/03/2014    GLUCOSE 111 12/03/2014    PROT 7.5 08/20/2010    CALCIUM 8.5 12/03/2014    BILITOT 0.91 08/20/2010    ALKPHOS 56 08/20/2010    AST 33 08/20/2010    ALT 28 08/20/2010       POC Tests: No results for input(s): POCGLU, POCNA, POCK, POCCL, POCBUN, POCHEMO, POCHCT in the last 72 hours. Coags: No results found for: PROTIME, INR, APTT    HCG (If Applicable): No results found for: PREGTESTUR, PREGSERUM, HCG, HCGQUANT     ABGs: No results found for: PHART, PO2ART, XUT6QNO, LWC5OII, BEART, P7MDZWPQ     Type & Screen (If Applicable):  No results found for: LABABO, LABRH    Drug/Infectious Status (If Applicable):  No results found for: HIV, HEPCAB    COVID-19 Screening (If Applicable):   Lab Results   Component Value Date    COVID19 NOT DETECTED 09/07/2020         Anesthesia Evaluation  Patient summary reviewed and Nursing notes reviewed  Airway: Mallampati: I  TM distance: >3 FB   Neck ROM: full  Mouth opening: > = 3 FB Dental: normal exam         Pulmonary:Negative Pulmonary ROS and normal exam  breath sounds clear to auscultation                             Cardiovascular:Negative CV ROS            Rhythm: regular  Rate: normal                    Neuro/Psych:   Negative Neuro/Psych ROS              GI/Hepatic/Renal: Neg GI/Hepatic/Renal ROS            Endo/Other:    (+) : arthritis:., .                 Abdominal:           Vascular: negative vascular ROS. Anesthesia Plan      general     ASA 2       Induction: intravenous. MIPS: Postoperative opioids intended and Prophylactic antiemetics administered. Anesthetic plan and risks discussed with patient. Plan discussed with CRNA.                   Saleem Dexter MD   9/11/2020

## 2020-09-12 NOTE — OP NOTE
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 OPERATIONS PERFORMED:  Right knee diagnostic video arthroscopy,  arthroscopic partial medial meniscectomy, partial lateral meniscectomy,  chondroplasty of the patellofemoral joint medial and lateral  compartments, and excision of medial shelf plica. PRIMARY SURGEON:  Danika Shabazz MD    FIRST ASSISTANT:  Karl Treadwell MD    ANESTHESIA:  General.    ESTIMATED BLOOD LOSS:  Minimal.    COMPLICATIONS:  None apparent. POSTOPERATIVE CONDITION:  To the recovery room. SPECIMEN:  None. IMPLANTS:  None. DRAINS:  None. INDICATIONS FOR THE SURGERY:  The patient is a very pleasant 68-year-old  male, who has had a long history of right knee pain, refractory to  conservative management, affecting his activities of daily living. He  actually had acute on chronic pain. MRI proven medial meniscus tear as  well as tricompartmental osteoarthritis with focal cartilage defects. Risks, benefits, and alternatives of the surgery as well as limitations  of the surgery especially in light of preexisting chondral changes were  clearly discussed with the patient. The patient wished to proceed with  surgery.     DETAILS OF THE PROCEDURE:  The patient was brought to the operating  room. After administration of general anesthesia, he was placed on the  OR table in a standard arthroscopic position. The right lower extremity  was prepped and draped in a normal sterile fashion. Limb was  exsanguinated. Tourniquet was inflated to 200 mmHg. Standard  two-portal arthroscopy was performed with the following findings:    1. PATELLOFEMORAL JOINT:  Patellofemoral joint had grade 3 chondral  changes throughout the trochlear groove as well as the median ridge of  the patella, for which a chondroplasty was performed, debriding loose  cartilages back to a stable rim. Extensive chondroplasty was performed  in the trochlear groove as well as the median ridge of the patella. There was a large pathologic medial shelf plica, which was excoriating  the medial femoral condyle. This was excised using a 4.0 shaver. Any  bleeding vessels were coagulated using the ArthroCare radiofrequency  device. 2.  THE MEDIAL COMPARTMENT:  The medial compartment had an extensive  tearing of the medial meniscus going from the posterior horn all the way  to the midbody, for which a partial medial meniscectomy was performed  using a combination of baskets, 4.0 shaver as well as an ArthroCare  radiofrequency device, sculpting the meniscus tear back to a stable rim. The patient did have grade 3 and 4 chondral changes throughout the  medial femoral condyle, for which a chondroplasty was performed  debriding loose cartilages back to a stable rim. 3.  THE NOTCH:  The notch had an intact ACL and PCL with no evidence of  ligamentous instability. 4.  THE LATERAL COMPARTMENT:  The lateral compartment had anterior and  midbody lateral meniscus tear, for which a partial lateral meniscectomy  was performed using a combination of baskets, 4.0 shaver as well as an  ArthroCare radiofrequency device, sculpting the meniscus tear back to a  stable rim.   The patient did have a central focal cartilage defect on  the femoral condyle, for which a chondroplasty was performed, debriding  the loose cartilages back to a stable rim. The knee was then irrigated with copious amounts of irrigation solution. Portals were closed using a 4-0 Monocryl suture. Dry sterile  compression dressing was applied. The patient was then taken to the  recovery room in stable condition, having tolerated the procedure well.         Marie Nunez MD    D: 09/11/2020 11:21:54       T: 09/11/2020 14:43:36     /[email or web address]  Job#: [id number]     Doc#: [id number]    CC:  Jazmin Yanes

## 2020-09-15 ENCOUNTER — HOSPITAL ENCOUNTER (OUTPATIENT)
Dept: PHYSICAL THERAPY | Age: 70
Setting detail: THERAPIES SERIES
Discharge: HOME | End: 2020-09-15
Payer: MEDICARE

## 2020-09-15 PROCEDURE — 97161 PT EVAL LOW COMPLEX 20 MIN: CPT

## 2020-09-15 PROCEDURE — 97016 VASOPNEUMATIC DEVICE THERAPY: CPT

## 2020-09-15 PROCEDURE — 97110 THERAPEUTIC EXERCISES: CPT

## 2020-09-15 PROCEDURE — 97112 NEUROMUSCULAR REEDUCATION: CPT

## 2020-09-15 PROCEDURE — 97140 MANUAL THERAPY 1/> REGIONS: CPT

## 2020-09-15 NOTE — PLAN OF CARE
[FreeTextEntry1] : 40 year old male with PMH of DMII presenting to clinic for f/up visit\par \par DMII: Well controlled, now in prediabetic range \par -HGB A1C: 6.2% \par -Optho: Up to  date \par -Podiatry: referral given  \par -Diet and Exercise counselled\par -c/w Metformin \par \par Essential HTN: well Controlled\par -c/w Lisinopril \par \par Hyperlipemia \par -C/w Life style modification and Lipitor \par \par HCM\par -Flu vaccine given today\par -RTC in 3 month versus riding a cart), and skiing. He notes overall he is doing well since surgery. His most noted difficulty is the feeling of tightness above his knee in the front of his thigh with trying to bend his right knee. He also would like to be able to mow his lawn. Relevant Medical History: Osteoarthrits  Functional Disability Index (LEFS): 35%disability (52/80)    Height: 5'8\" Weight: 194lbs  Pain Scale: 1/10  Easing factors: rest  Provocative factors: Walking, squatting, kneeling. Type: []Constant   [x]Intermittent  []Radiating []Localized []other:     Numbness/Tingling: None    Occupation/School: Retired    Living Status/Prior Level of Function: Independent with ADLs and IADLs    OBJECTIVE:      Functional Mobility:    Gait: Inadequate knee extension in midstance and terminal stance on the right. ROM LEFT RIGHT   Knee ext 0 -6 // -2   Knee Flex 142 135                  Swelling           Mild right popliteal swelling. Moderate right suprapatellar swelling       Mild quadriceps activation deficit. Reflexes/Sensation: No sensory deficits reported   []Dermatomes/Myotomes intact    []Reflexes equal and normal bilaterally   []Other:    Joint mobility:    []Normal    [x]Hypo   []Hyper    Palpation: TTP of     Bandages/Dressings/Incisions: Surgical ports are clean, healing well, with no signs of infection. Patient notes he took the bandages off. [x] Patient history, allergies, meds reviewed. Medical chart reviewed. See intake form. Review Of Systems (ROS):  [x]Performed Review of systems (Integumentary, CardioPulmonary, Neurological) by intake and observation. Intake form has been scanned into medical record. Patient has been instructed to contact their primary care physician regarding ROS issues if not already being addressed at this time.       Co-morbidities/Complexities (which will affect course of rehabilitation):   []None           Arthritic conditions   []Rheumatoid endurance, and increase right LE strength in order to return to his prior level of function and activity without the onset of right knee pain. Functional Impairments:     [x]Noted lumbar/proximal hip/LE joint hypomobility   [x]Decreased LE functional ROM   [x]Decreased core/proximal hip strength and neuromuscular control   [x]Decreased LE functional strength   [x]Reduced balance/proprioceptive control   []other:      Functional Activity Limitations (from functional questionnaire and intake)   []Reduced ability to tolerate prolonged functional positions   []Reduced ability or difficulty with changes of positions or transfers between positions   []Reduced ability to maintain good posture and demonstrate good body mechanics with sitting, bending, and lifting   []Reduced ability to sleep   [] Reduced ability or tolerance with driving and/or computer work   []Reduced ability to perform lifting, carrying tasks   [x]Reduced ability to squat   []Reduced ability to forward bend   []Reduced ability to ambulate prolonged functional periods/distances/surfaces   [x]Reduced ability to ascend/descend stairs   [x]Reduced ability to run, hop, cut or jump   []other:    Participation Restrictions   []Reduced participation in self care activities   [x]Reduced participation in home management activities   []Reduced participation in work activities   []Reduced participation in social activities. [x]Reduced participation in sport/recreation activities. Classification :    [x]Signs/symptoms consistent with post-surgical status including decreased ROM, strength and function.    []Signs/symptoms consistent with joint sprain/strain   []Signs/symptoms consistent with patella-femoral syndrome   []Signs/symptoms consistent with knee OA/hip OA   []Signs/symptoms consistent with internal derangement of knee/Hip   []Signs/symptoms consistent with functional hip weakness/NMR control      []Signs/symptoms consistent with tendinitis/tendinosis    []signs/symptoms consistent with pathology which may benefit from Dry needling      []other:      Prognosis/Rehab Potential:      []Excellent   [x]Good    []Fair   []Poor    Tolerance of evaluation/treatment:    []Excellent   [x]Good    []Fair   []Poor  Physical Therapy Evaluation Complexity Justification  [x] A history of present problem with:  [] no personal factors and/or comorbidities that impact the plan of care;  [x]1-2 personal factors and/or comorbidities that impact the plan of care  []3 personal factors and/or comorbidities that impact the plan of care  [x] An examination of body systems using standardized tests and measures addressing any of the following: body structures and functions (impairments), activity limitations, and/or participation restrictions;:  [] a total of 1-2 or more elements   [x] a total of 3 or more elements   [] a total of 4 or more elements   [x] A clinical presentation with:  [x] stable and/or uncomplicated characteristics   [] evolving clinical presentation with changing characteristics  [] unstable and unpredictable characteristics;   [x] Clinical decision making of [x] low, [] moderate, [] high complexity using standardized patient assessment instrument and/or measurable assessment of functional outcome. [x] EVAL (LOW) 94740 (typically 20 minutes face-to-face)  [] EVAL (MOD) 05806 (typically 30 minutes face-to-face)  [] EVAL (HIGH) 83241 (typically 45 minutes face-to-face)  [] RE-EVAL       PLAN:   Frequency/Duration:  1-2x per week for 8 weeks (beginning 9/15/20, ending 11/10/20)  Interventions:  [x]  Therapeutic exercise including: strength training, ROM, for Lower extremity and core   [x]  NMR activation and proprioception for LE, Glutes and Core   [x]  Manual therapy as indicated for LE, Hip and spine to include: Dry Needling/IASTM, STM, PROM, Gr I-IV mobilizations, manipulation.    [x] Modalities as needed that may include: thermal agents, E-stim,

## 2020-09-15 NOTE — FLOWSHEET NOTE
[facility],  [address] [place]  Phone: [phone]  Fax [phone]    Physical Therapy Treatment Note/ Progress Report:           Date:  9/15/2020    Patient Name:  Ted Lester    :  1950  MRN: [id number]  Restrictions/Precautions:    Medical/Treatment Diagnosis Information:  Diagnosis: K04.190 (ICD-10-CM) - Complex tear of medial meniscus of right knee as current injury; S83.271 (ICD-10-CM) - Complex tear of lateral meniscus of right knee as current injury; A64.01 (CHOCO-23-MQ) - Plica syndrome of right knee; M17.11 (ICD-10-CM) - Primary osteoarthritis of right knee  Treatment Diagnosis: Right Knee Pain (M25.561); Right Knee Effusion (M25.461); Right Knee Stiffness (M25.661);  Right Lower Extremity Weakness (F21.76)  Insurance/Certification information:  PT Insurance Information: [facility]  Physician Information:  Referring Practitioner: Juan Antonio Bella MD  Has the plan of care been signed (Y/N):        []  Yes  [x]  No     Date of Patient follow up with Physician: 20 (virtual visit)      Is this a Progress Report:     []  Yes  [x]  No        If Yes:  Date Range for reporting period:  Beginnin/15/20  Ending:     Progress report will be due (10 Rx or 30 days whichever is less):        Recertification will be due (POC Duration  / 90 days whichever is less): 11/10/20       Visit # Insurance Allowable Requires auth   1 MED NEC    [x]no        []yes:     Functional Scale (LEFS):   9/15/20: % Disability (/80)     Practice Pattern I: Bony or Soft Tissue Surgery      Therapy Diagnosis/Practice Pattern: I      Number of Comorbidities:  []0     [x]1-2    []3+    Latex Allergy:  [x]NO      []YES  Preferred Language for Healthcare:   [x]English       []other:      Pain level:  1/10     SUBJECTIVE:  See eval    OBJECTIVE: See eval   Observation:    Test measurements:      RESTRICTIONS/PRECAUTIONS: WBAT    Exercises/Interventions:     Therapeutic Ex (86291) Volume Notes/CUES   Circuit (3x)  -Seated Hamstring Stretch  -Standing Gastroc Stretch   15x3\"  15x3\"                                                 Manual Intervention (43167) 9 min    STM to right Quadriceps, hamstring, and calf     Grade II-III inferior and superior glides fo the right patella                         NMR re-education (09050)  CUES NEEDED   [place]is[place] NMES quadsets + Heelside 6 min, 10\" on / 10\" off                                            Therapeutic Activity (20043)                                   Additional time was spent explaining exercise instruction, exercise set up, and rest breaks. Patient Education:   · Patient educated on examination findings, plan of care, and home exercise program. Patient received verbal instructions to immediately inform their attending clinician if they endured increased discomfort or perceived any component of their therapy to be counterproductive, so that their therapeutic parameters could be modified accordingly. Patient acknowledged these instructions and agreed to comply. · Do not swim for 10-14 days to full assure surgical ports are closed. Access Code: [email or web address]   URL: [email or web address]   Date: 09/15/2020   Prepared by: Israel Berg     Exercises  Long Sitting Quad Set - 1 reps - 1 sets - 3 sec hold - 6 minutes Time - 3x daily - 7x weekly  Sitting Heel Slide with Towel - 1 sets - 1 sec hold - 6 minutes Time - 3x daily - 7x weekly  Seated Table Hamstring Stretch - 15 reps - 3 sets - 3 sec Hold - 3x daily - 7x weekly  Gastroc Stretch on Wall - 15 reps - 3 sets - 3 sec hold - 3x daily - 7x weekly      Therapeutic Exercise and NMR EXR  [x] (91232) Provided verbal/tactile cueing for activities related to strengthening, flexibility, endurance, ROM for improvements in LE, proximal hip, and core control with self care, mobility, lifting, ambulation.  [] (61677) Provided verbal/tactile cueing for activities related to improving balance, coordination, kinesthetic sense, posture, motor skill, proprioception  to assist with LE, proximal hip, and core control in self care, mobility, lifting, ambulation and eccentric single leg control. NMR and Therapeutic Activities:    [x] (47032 or 43665) Provided verbal/tactile cueing for activities related to improving balance, coordination, kinesthetic sense, posture, motor skill, proprioception and motor activation to allow for proper function of core, proximal hip and LE with self care and ADLs  [] (07475) Gait Re-education- Provided training and instruction to the patient for proper LE, core and proximal hip recruitment and positioning and eccentric body weight control with ambulation re-education including up and down stairs     Home Exercise Program:    [x] (49880) Reviewed/Progressed HEP activities related to strengthening, flexibility, endurance, ROM of core, proximal hip and LE for functional self-care, mobility, lifting and ambulation/stair navigation   [] (61587)Reviewed/Progressed HEP activities related to improving balance, coordination, kinesthetic sense, posture, motor skill, proprioception of core, proximal hip and LE for self care, mobility, lifting, and ambulation/stair navigation      Manual Treatments:  PROM / STM / Oscillations-Mobs:  G-I, II, III, IV (PA's, Inf., Post.)  [x] (02572) Provided manual therapy to mobilize LE, proximal hip and/or LS spine soft tissue/joints for the purpose of modulating pain, promoting relaxation,  increasing ROM, reducing/eliminating soft tissue swelling/inflammation/restriction, improving soft tissue extensibility and allowing for proper ROM for normal function with self care, mobility, lifting and ambulation. Modalities:     [x] GAME READY (VASO)- for significant edema, swelling, pain control.      Charges:  Timed Code Treatment Minutes: 41   Total Treatment Minutes: 68     Time In: 4:01pm  Time Out: 5:09pm      [x] EVAL (LOW) 83051 (typically 20 minutes face-to-face)  [] EVAL (MOD) 28202 (typically 30 minutes face-to-face)  [] EVAL (HIGH) 68701 (typically 45 minutes face-to-face)  [] RE-EVAL     [x] PP(54405) x   1  [] IONTO  [x] NMR (47727) x 1    [x] VASO  [x] Manual (46166) x 1     [] Other:  [] TA x      [] Mech Traction (71885)  [] ES(attended) (81623)      [] ES (un) (99640):       GOALS:  Patient stated goal: Patient will be able to walk and play 9 holes of gold without restriction or the onset of right knee pain. [] Progressing: [] Met: [] Not Met: [] Adjusted    Therapist goals for Patient:   Short Term Goals: To be achieved in: 2 weeks  1. Independent in HEP and progression per patient tolerance, in order to prevent re-injury. [] Progressing: [] Met: [] Not Met: [] Adjusted  2. Patient will have a decrease in pain to facilitate improvement in movement, function, and ADLs as indicated by Functional Deficits. [] Progressing: [] Met: [] Not Met: [] Adjusted    Long Term Goals: To be achieved in:  weeks  1. Disability index score of 18% or less for the LEFS to assist with reaching prior level of function. [] Progressing: [] Met: [] Not Met: [] Adjusted  2. Patient will demonstrate ROM on affected side equal to the unaffected side to allow for proper joint functioning as indicated by patients Functional Deficits. [] Progressing: [] Met: [] Not Met: [] Adjusted  3. Patient will demonstrate an increase in Strength to good proximal hip strength and control, within 5lb HHD in LE to allow for proper functional mobility as indicated by patients Functional Deficits. [] Progressing: [] Met: [] Not Met: [] Adjusted  4. Patient will be able to mow the lawn without increased symptoms or restriction. [] Progressing: [] Met: [] Not Met: [] Adjusted  5. Patient will be able to ride his bike for 30 minutes without restriction or the onset of right knee pain.    [] Progressing: [] Met: [] Not Met: [] Adjusted    Progression Towards Functional goals:  [] Patient is progressing as expected towards functional goals listed. [] Progression is slowed due to complexities listed. [] Progression has been slowed due to co-morbidities. [x] Plan just implemented, too soon to assess goals progression  [] Other:         Overall Progression Towards Functional goals/ Treatment Progress Update:  [] Patient is progressing as expected towards functional goals listed. [] Progression is slowed due to complexities/Impairments listed. [] Progression has been slowed due to co-morbidities. [x] Plan just implemented, too soon to assess goals progression <30days   [] Goals require adjustment due to lack of progress  [] Patient is not progressing as expected and requires additional follow up with physician  [] Other    Prognosis for POC: [x] Good [] Fair  [] Poor      Patient requires continued skilled intervention: [x] Yes  [] No    Treatment/Activity Tolerance:  [x] Patient able to complete treatment  [] Patient limited by fatigue  [] Patient limited by pain    [] Patient limited by other medical complications  [] Other:     ASSESSMENT: Patient is a 79 y.o male s/p right medial menisectomy, lateral menisectomy, patellofemoral chondroplasty, and medial shelf plicectomy on 7/74/04. He would benefit from skilled PT intervention to decrease right knee swelling, improve right knee ROM, increase right LE muscular endurance, and increase right LE strength in order to return to his prior level of function and activity without the onset of right knee pain.     Return to Play: (if applicable)   []  Stage 1: Intro to Strength   []  Stage 2: Return to Run and Strength   []  Stage 3: Return to Jump and Strength   []  Stage 4: Dynamic Strength and Agility   []  Stage 5: Sport Specific Training     []  Ready to Return to Play, Meets All Above Stages   []  Not Ready for Return to Sports   Comments:                               PLAN: 1-2x per week for 8 weeks (beginning 9/15/20, ending 11/10/20)  [] Continue per plan of care [] Alter current plan (see comments above)  [x] Plan of care initiated [] Hold pending MD visit [] Discharge      Electronically signed by:  Pablo Marion PT, DPT ([place] PT License #: [id number])    Note: If patient does not return for scheduled/ recommended follow up visits, this note will serve as a discharge from care along with most recent update on progress.

## 2020-09-17 ENCOUNTER — VIRTUAL VISIT (OUTPATIENT)
Dept: ORTHOPEDIC SURGERY | Age: 70
End: 2020-09-17

## 2020-09-17 PROCEDURE — 99024 POSTOP FOLLOW-UP VISIT: CPT | Performed by: PHYSICIAN ASSISTANT

## 2020-09-17 NOTE — PROGRESS NOTES
Keny Knox is a 79 y.o. male being evaluated by a Virtual Visit (video visit) encounter to address concerns as mentioned above. A caregiver was present when appropriate. Due to this being a TeleHealth encounter (During AAIVX-21 public health emergency), evaluation of the following organ systems was limited: Vitals/Constitutional/EENT/Resp/CV/GI//MS/Neuro/Skin/Heme-Lymph-Imm. Pursuant to the emergency declaration under the [address] [place] [address] and the [employer] Act, this Virtual Visit was conducted with patient's (and/or legal guardian's) consent, to reduce the patient's risk of exposure to COVID-19 and provide necessary medical care. The patient (and/or legal guardian) has also been advised to contact this office for worsening conditions or problems, and seek emergency medical treatment and/or call 911 if deemed necessary. Services were provided through a video synchronous discussion virtually to substitute for in-person clinic visit. Patient's location was at their home. --Carmella Saldana PA-C on 9/17/2020 at 11:06 AM    An electronic signature was used to authenticate this note. History of present illness: The patient returns today for their first postoperative visit after knee arthroscopy. Pain control has been satisfactory with oral medications. There have been no fevers or chills. Patient is doing very well. He denies any significant pain. He states he only gets achy discomfort at times. He is 6 days status post right knee partial medial meniscectomy, partial lateral meniscectomy, chondroplasty, and plical band excision. Physical examination: Inspection reveals expected swelling. Incisions are clean, dry, and intact. No signs of infection. Range of motion limited by pain and swelling. There is no calf pain or signs of DVT with a negative Homans sign. Assessment/plan:  The patient is doing well after knee arthroscopy. Surgical findings were reviewed today. I have recommended ice, judicious use of the NSAIDs with GI precautions, and physical therapy to diminish swelling and restore both range of motion and strength. We will see the patient back in 3-4 weeks if needed. The patient verbalized good understanding of the plan.

## 2020-09-18 ENCOUNTER — HOSPITAL ENCOUNTER (OUTPATIENT)
Dept: PHYSICAL THERAPY | Age: 70
Setting detail: THERAPIES SERIES
Discharge: HOME | End: 2020-09-18
Payer: MEDICARE

## 2020-09-18 PROCEDURE — 97112 NEUROMUSCULAR REEDUCATION: CPT

## 2020-09-18 PROCEDURE — 97110 THERAPEUTIC EXERCISES: CPT

## 2020-09-18 PROCEDURE — 97140 MANUAL THERAPY 1/> REGIONS: CPT

## 2020-09-18 NOTE — FLOWSHEET NOTE
complaints. OBJECTIVE:    LRight knee AROM: -3-142 // -1 knee extension    RESTRICTIONS/PRECAUTIONS: WBAT    Exercises/Interventions:     Therapeutic Ex (37163) Volume Notes/CUES   Leg Press 5 min, 60#    Circuit (1x)  -Front Plank  -[address]   30\"  12x bilaterally                                                 Manual Intervention (64039) 8 min    STM to right hamstring and calf     Grade II-III superior glides fo the right patella                         NMR re-education (37216)  CUES NEEDED   [place] NMES quadsets + Heelside 6 min, 10\" on / 10\" off    Circuit (1x):  -Forward Step onto BOSU  -Lateral Step onto BOSU   12x  12x                                       Therapeutic Activity (48870)                                   Additional time was spent explaining exercise instruction, exercise set up, and rest breaks. Patient Education:   · HEP updated; provided new handout. · Do not swim for 10-14 days to full assure surgical ports are closed. Access Code: [email or web address]   URL: [email or web address]   Date: 09/18/2020   Prepared by: Mindy Sinclair     Exercises  Long Sitting Quad Set - 1 reps - 1 sets - 3 sec hold - 6 minutes Time - 3x daily - 7x weekly  Sitting Heel Slide with Towel - 1 sets - 1 sec hold - 6 minutes Time - 3x daily - 7x weekly  Standard Plank - 1 reps - 3 sets - 30 sec hold - 1x daily - 7x weekly  Side Plank with Clam - 12 reps - 3 sets - 1x daily - 7x weekly  Seated Table Hamstring Stretch - 15 reps - 3 sets - 3 sec Hold - 3x daily - 7x weekly  Gastroc Stretch on Wall - 15 reps - 3 sets - 3 sec hold - 3x daily - 7x weekly      Therapeutic Exercise and NMR EXR  [x] (72233) Provided verbal/tactile cueing for activities related to strengthening, flexibility, endurance, ROM for improvements in LE, proximal hip, and core control with self care, mobility, lifting, ambulation.  [] (62857) Provided verbal/tactile cueing for activities related to improving balance, coordination, kinesthetic sense, posture, motor skill, proprioception  to assist with LE, proximal hip, and core control in self care, mobility, lifting, ambulation and eccentric single leg control. NMR and Therapeutic Activities:    [x] (88489 or 40520) Provided verbal/tactile cueing for activities related to improving balance, coordination, kinesthetic sense, posture, motor skill, proprioception and motor activation to allow for proper function of core, proximal hip and LE with self care and ADLs  [] (78830) Gait Re-education- Provided training and instruction to the patient for proper LE, core and proximal hip recruitment and positioning and eccentric body weight control with ambulation re-education including up and down stairs     Home Exercise Program:    [x] (25835) Reviewed/Progressed HEP activities related to strengthening, flexibility, endurance, ROM of core, proximal hip and LE for functional self-care, mobility, lifting and ambulation/stair navigation   [] (60256)Reviewed/Progressed HEP activities related to improving balance, coordination, kinesthetic sense, posture, motor skill, proprioception of core, proximal hip and LE for self care, mobility, lifting, and ambulation/stair navigation      Manual Treatments:  PROM / STM / Oscillations-Mobs:  G-I, II, III, IV (PA's, Inf., Post.)  [x] (31775) Provided manual therapy to mobilize LE, proximal hip and/or LS spine soft tissue/joints for the purpose of modulating pain, promoting relaxation,  increasing ROM, reducing/eliminating soft tissue swelling/inflammation/restriction, improving soft tissue extensibility and allowing for proper ROM for normal function with self care, mobility, lifting and ambulation. Modalities:     [x] GAME READY (VASO)- for significant edema, swelling, pain control.      Charges:  Timed Code Treatment Minutes: 45   Total Treatment Minutes: 45     Time In: 2:15pm  Time Out: 3:00pm      [] EVAL (LOW) 61480 (typically 20 minutes face-to-face)  [] EVAL (MOD) 13934 (typically 30 minutes face-to-face)  [] EVAL (HIGH) 98005 (typically 45 minutes face-to-face)  [] RE-EVAL     [x] EV(02421) x   1  [] IONTO  [x] NMR (14252) x 1    [x] VASO  [x] Manual (67348) x 1     [] Other:  [] TA x      [] Mech Traction (13442)  [] ES(attended) (15473)      [] ES (un) (53574):       GOALS:  Patient stated goal: Patient will be able to walk and play 9 holes of gold without restriction or the onset of right knee pain. [x] Progressing: [] Met: [] Not Met: [] Adjusted    Therapist goals for Patient:   Short Term Goals: To be achieved in: 2 weeks  1. Independent in HEP and progression per patient tolerance, in order to prevent re-injury. [x] Progressing: [] Met: [] Not Met: [] Adjusted  2. Patient will have a decrease in pain to facilitate improvement in movement, function, and ADLs as indicated by Functional Deficits. [] Progressing: [x] Met: [] Not Met: [] Adjusted    Long Term Goals: To be achieved in:  weeks  1. Disability index score of 18% or less for the LEFS to assist with reaching prior level of function. [x] Progressing: [] Met: [] Not Met: [] Adjusted  2. Patient will demonstrate ROM on affected side equal to the unaffected side to allow for proper joint functioning as indicated by patients Functional Deficits. [x] Progressing: [] Met: [] Not Met: [] Adjusted  3. Patient will demonstrate an increase in Strength to good proximal hip strength and control, within 5lb HHD in LE to allow for proper functional mobility as indicated by patients Functional Deficits. [x] Progressing: [] Met: [] Not Met: [] Adjusted  4. Patient will be able to mow the lawn without increased symptoms or restriction. [x] Progressing: [] Met: [] Not Met: [] Adjusted  5. Patient will be able to ride his bike for 30 minutes without restriction or the onset of right knee pain.    [x] Progressing: [] Met: [] Not Met: [] Adjusted    Progression Towards Functional goals:  [x] Patient is progressing as expected towards functional goals listed. [] Progression is slowed due to complexities listed. [] Progression has been slowed due to co-morbidities. [] Plan just implemented, too soon to assess goals progression  [] Other:         Overall Progression Towards Functional goals/ Treatment Progress Update:  [x] Patient is progressing as expected towards functional goals listed. [] Progression is slowed due to complexities/Impairments listed. [] Progression has been slowed due to co-morbidities. [] Plan just implemented, too soon to assess goals progression <30days   [] Goals require adjustment due to lack of progress  [] Patient is not progressing as expected and requires additional follow up with physician  [] Other    Prognosis for POC: [x] Good [] Fair  [] Poor      Patient requires continued skilled intervention: [x] Yes  [] No    Treatment/Activity Tolerance:  [x] Patient able to complete treatment  [] Patient limited by fatigue  [] Patient limited by pain    [] Patient limited by other medical complications  [] Other:     ASSESSMENT: Patient tolerated treatment well. Therapeutic exercise was progressed without adverse effect. He would benefit from skilled PT intervention to decrease right knee swelling, improve right knee ROM, increase right LE muscular endurance, and increase right LE strength in order to return to his prior level of function and activity without the onset of right knee pain.     Return to Play: (if applicable)   []  Stage 1: Intro to Strength   []  Stage 2: Return to Run and Strength   []  Stage 3: Return to Jump and Strength   []  Stage 4: Dynamic Strength and Agility   []  Stage 5: Sport Specific Training     []  Ready to Return to Play, Meets All Above Stages   []  Not Ready for Return to Sports   Comments:                               PLAN: 1-2x per week for 8 weeks (beginning 9/15/20, ending 11/10/20)  [x] Continue per plan of care [] Alter current plan (see comments above)  []

## 2020-09-21 ENCOUNTER — HOSPITAL ENCOUNTER (OUTPATIENT)
Dept: PHYSICAL THERAPY | Age: 70
Setting detail: THERAPIES SERIES
Discharge: HOME | End: 2020-09-21
Payer: MEDICARE

## 2020-09-21 PROCEDURE — 97110 THERAPEUTIC EXERCISES: CPT

## 2020-09-21 PROCEDURE — 97140 MANUAL THERAPY 1/> REGIONS: CPT

## 2020-09-21 NOTE — FLOWSHEET NOTE
 Right knee AROM: -4-144 // -2 knee extension   Left knee Active Extension: -2   Mild suprapatellar swelling on right. RESTRICTIONS/PRECAUTIONS: WBAT    Exercises/Interventions:     Therapeutic Ex (39954) Volume Notes/CUES   Leg Press 5 min, 60#    Circuit (2x)  -SL RDL  -X-Walks  -Cable Anti-Rotation Presses   12x bilaterally  12x each way, 1/2-ich red  12x bilaterally: 15#, 20#    Circuit (2x)  -[address]   30\"  12x bilaterally                                                 Manual Intervention (27760) 8 min    STM to right hamstring and calf     Grade III-IV superior glides fo the right patella                         NMR re-education (44867)  CUES NEEDED                                                Therapeutic Activity (41162)                                   Additional time was spent explaining exercise instruction, exercise set up, and rest breaks. Patient Education:   · HEP updated; provided new handout. · Do not swim for 10-14 days to full assure surgical ports are closed. Access Code: [email or web address]   URL: [email or web address]   Date: 09/21/2020   Prepared by: Doretha Short     Exercises  Long Sitting Quad Set - 1 reps - 1 sets - 3 sec hold - 6 minutes Time - 3x daily - 7x weekly  Sitting Heel Slide with Towel - 1 sets - 1 sec hold - 6 minutes Time - 3x daily - 7x weekly  Forward T - 12 reps - 3 sets - 1x daily - 3x weekly  X Band Walk - 12 reps - 3 sets - 1x daily - 3x weekly  Squatting Anti-Rotation Press - 12 reps - 3 sets - 1x daily - 3x weekly  Standard Plank - 1 reps - 3 sets - 30 sec hold - 1x daily - 7x weekly  Side Plank with Clam - 12 reps - 3 sets - 1x daily - 7x weekly  Seated Table Hamstring Stretch - 15 reps - 3 sets - 3 sec Hold - 3x daily - 7x weekly  Gastroc Stretch on Wall - 15 reps - 3 sets - 3 sec hold - 3x daily - 7x weekly      Therapeutic Exercise and NMR EXR  [x] (63061) Provided verbal/tactile cueing for activities related to strengthening, flexibility, endurance, ROM for improvements in LE, proximal hip, and core control with self care, mobility, lifting, ambulation.  [] (54880) Provided verbal/tactile cueing for activities related to improving balance, coordination, kinesthetic sense, posture, motor skill, proprioception  to assist with LE, proximal hip, and core control in self care, mobility, lifting, ambulation and eccentric single leg control. NMR and Therapeutic Activities:    [x] (90953 or 18379) Provided verbal/tactile cueing for activities related to improving balance, coordination, kinesthetic sense, posture, motor skill, proprioception and motor activation to allow for proper function of core, proximal hip and LE with self care and ADLs  [] (20872) Gait Re-education- Provided training and instruction to the patient for proper LE, core and proximal hip recruitment and positioning and eccentric body weight control with ambulation re-education including up and down stairs     Home Exercise Program:    [x] (98746) Reviewed/Progressed HEP activities related to strengthening, flexibility, endurance, ROM of core, proximal hip and LE for functional self-care, mobility, lifting and ambulation/stair navigation   [] (49538)Reviewed/Progressed HEP activities related to improving balance, coordination, kinesthetic sense, posture, motor skill, proprioception of core, proximal hip and LE for self care, mobility, lifting, and ambulation/stair navigation      Manual Treatments:  PROM / STM / Oscillations-Mobs:  G-I, II, III, IV (PA's, Inf., Post.)  [x] (44442) Provided manual therapy to mobilize LE, proximal hip and/or LS spine soft tissue/joints for the purpose of modulating pain, promoting relaxation,  increasing ROM, reducing/eliminating soft tissue swelling/inflammation/restriction, improving soft tissue extensibility and allowing for proper ROM for normal function with self care, mobility, lifting and ambulation.      Modalities:     [x] GAME READY (VASO)- for significant edema, swelling, pain control. Charges:  Timed Code Treatment Minutes: 44   Total Treatment Minutes: 44     Time In: 12:49pm (Patient late)  Time Out: 1:33pm      [] EVAL (LOW) 72105 (typically 20 minutes face-to-face)  [] EVAL (MOD) 75410 (typically 30 minutes face-to-face)  [] EVAL (HIGH) 53749 (typically 45 minutes face-to-face)  [] RE-EVAL     [x] XC(00758) x   2  [] IONTO  [] NMR (47842) x 1    [x] VASO  [x] Manual (16100) x 1     [] Other:  [] TA x      [] Mech Traction (10589)  [] ES(attended) (95777)      [] ES (un) (55486):       GOALS:  Patient stated goal: Patient will be able to walk and play 9 holes of gold without restriction or the onset of right knee pain. [x] Progressing: [] Met: [] Not Met: [] Adjusted    Therapist goals for Patient:   Short Term Goals: To be achieved in: 2 weeks  1. Independent in HEP and progression per patient tolerance, in order to prevent re-injury. [] Progressing: [x] Met: [] Not Met: [] Adjusted  2. Patient will have a decrease in pain to facilitate improvement in movement, function, and ADLs as indicated by Functional Deficits. [] Progressing: [x] Met: [] Not Met: [] Adjusted    Long Term Goals: To be achieved in:  weeks  1. Disability index score of 18% or less for the LEFS to assist with reaching prior level of function. [x] Progressing: [] Met: [] Not Met: [] Adjusted  2. Patient will demonstrate ROM on affected side equal to the unaffected side to allow for proper joint functioning as indicated by patients Functional Deficits. [x] Progressing: [] Met: [] Not Met: [] Adjusted  3. Patient will demonstrate an increase in Strength to good proximal hip strength and control, within 5lb HHD in LE to allow for proper functional mobility as indicated by patients Functional Deficits. [x] Progressing: [] Met: [] Not Met: [] Adjusted  4. Patient will be able to mow the lawn without increased symptoms or restriction.    [x] Progressing: [] Met: [] Not Met: [] Adjusted  5. Patient will be able to ride his bike for 30 minutes without restriction or the onset of right knee pain. [x] Progressing: [] Met: [] Not Met: [] Adjusted    Progression Towards Functional goals:  [x] Patient is progressing as expected towards functional goals listed. [] Progression is slowed due to complexities listed. [] Progression has been slowed due to co-morbidities. [] Plan just implemented, too soon to assess goals progression  [] Other:         Overall Progression Towards Functional goals/ Treatment Progress Update:  [x] Patient is progressing as expected towards functional goals listed. [] Progression is slowed due to complexities/Impairments listed. [] Progression has been slowed due to co-morbidities. [] Plan just implemented, too soon to assess goals progression <30days   [] Goals require adjustment due to lack of progress  [] Patient is not progressing as expected and requires additional follow up with physician  [] Other    Prognosis for POC: [x] Good [] Fair  [] Poor      Patient requires continued skilled intervention: [x] Yes  [] No    Treatment/Activity Tolerance:  [x] Patient able to complete treatment  [] Patient limited by fatigue  [] Patient limited by pain    [] Patient limited by other medical complications  [] Other:     ASSESSMENT: Patient tolerated treatment well. Therapeutic exercise was progressed without adverse effect. He would benefit from skilled PT intervention to decrease right knee swelling, improve right knee ROM, increase right LE muscular endurance, and increase right LE strength in order to return to his prior level of function and activity without the onset of right knee pain.     Return to Play: (if applicable)   []  Stage 1: Intro to Strength   []  Stage 2: Return to Run and Strength   []  Stage 3: Return to Jump and Strength   []  Stage 4: Dynamic Strength and Agility   []  Stage 5: Sport Specific Training     []  Ready to Return to Play, Meets All Above Stages   []  Not Ready for Return to Sports   Comments:                               PLAN: 1-2x per week for 8 weeks (beginning 9/15/20, ending 11/10/20)  [x] Continue per plan of care [] Faraz Briseno current plan (see comments above)  [] Plan of care initiated [] Hold pending MD visit [] Discharge      Electronically signed by:  Jessica Archer PT, DPT ([place] PT License #: [id number])    Note: If patient does not return for scheduled/ recommended follow up visits, this note will serve as a discharge from care along with most recent update on progress.

## 2020-09-23 ENCOUNTER — HOSPITAL ENCOUNTER (OUTPATIENT)
Dept: PHYSICAL THERAPY | Age: 70
Setting detail: THERAPIES SERIES
Discharge: HOME | End: 2020-09-23
Payer: MEDICARE

## 2020-09-23 PROCEDURE — 97140 MANUAL THERAPY 1/> REGIONS: CPT

## 2020-09-23 PROCEDURE — 97110 THERAPEUTIC EXERCISES: CPT

## 2020-09-23 NOTE — FLOWSHEET NOTE
occurs when he is lying in bed at night. OBJECTIVE:    Right knee Active Extesnion: -5 // -2   Left knee Active Flexion: 145      RESTRICTIONS/PRECAUTIONS: WBAT    Exercises/Interventions:     Therapeutic Ex (41317) Volume Notes/CUES   Elliptical 5 min    Circuit (3x)  -SL RDL  -X-Walks  -Cable Anti-Rotation Presses   12x bilaterally  12x each way, 1/2-ich red  12x bilaterally: 20# Audrea Ee for last set of x-walks   Leg Press 5 min, 60#                                                 Manual Intervention (69771) 8 min    STM to right hamstring and calf     Grade III-IV superior glides fo the right patella                         NMR re-education (33149)  CUES NEEDED                                                Therapeutic Activity (82394)                                   Additional time was spent explaining exercise instruction, exercise set up, and rest breaks. Patient Education:   · Continue current HEP. Provided gray therabands for x-walks and anti-rotation presses. · Do not swim until 14 days post-op or scabs are gone from surgical port sites and incisions clearly closed, whatever comes later. · Go for gentle bike ride on 9/25/20, up to 30 minutes if comfortable, but can stop sooner if need be if he gets tired. Access Code: [email or web address]   URL: [email or web address]   Date: 09/21/2020   Prepared by: Allyn Palacio     Exercises  Long Sitting Quad Set - 1 reps - 1 sets - 3 sec hold - 6 minutes Time - 3x daily - 7x weekly  Sitting Heel Slide with Towel - 1 sets - 1 sec hold - 6 minutes Time - 3x daily - 7x weekly  Forward T - 12 reps - 3 sets - 1x daily - 3x weekly  X Band Walk - 12 reps - 3 sets - 1x daily - 3x weekly  Squatting Anti-Rotation Press - 12 reps - 3 sets - 1x daily - 3x weekly  Standard Plank - 1 reps - 3 sets - 30 sec hold - 1x daily - 7x weekly  Side Plank with Clam - 12 reps - 3 sets - 1x daily - 7x weekly  Seated Table Hamstring Stretch - 15 reps - 3 sets - 3 sec Hold - 3x daily - 7x weekly  Gastroc Stretch on Wall - 15 reps - 3 sets - 3 sec hold - 3x daily - 7x weekly      Therapeutic Exercise and NMR EXR  [x] (09669) Provided verbal/tactile cueing for activities related to strengthening, flexibility, endurance, ROM for improvements in LE, proximal hip, and core control with self care, mobility, lifting, ambulation.  [] (42679) Provided verbal/tactile cueing for activities related to improving balance, coordination, kinesthetic sense, posture, motor skill, proprioception  to assist with LE, proximal hip, and core control in self care, mobility, lifting, ambulation and eccentric single leg control.      NMR and Therapeutic Activities:    [] (23058 or 61704) Provided verbal/tactile cueing for activities related to improving balance, coordination, kinesthetic sense, posture, motor skill, proprioception and motor activation to allow for proper function of core, proximal hip and LE with self care and ADLs  [] (60637) Gait Re-education- Provided training and instruction to the patient for proper LE, core and proximal hip recruitment and positioning and eccentric body weight control with ambulation re-education including up and down stairs     Home Exercise Program:    [x] (93495) Reviewed/Progressed HEP activities related to strengthening, flexibility, endurance, ROM of core, proximal hip and LE for functional self-care, mobility, lifting and ambulation/stair navigation   [] (52449)Reviewed/Progressed HEP activities related to improving balance, coordination, kinesthetic sense, posture, motor skill, proprioception of core, proximal hip and LE for self care, mobility, lifting, and ambulation/stair navigation      Manual Treatments:  PROM / STM / Oscillations-Mobs:  G-I, II, III, IV (PA's, Inf., Post.)  [x] (79346) Provided manual therapy to mobilize LE, proximal hip and/or LS spine soft tissue/joints for the purpose of modulating pain, promoting relaxation,  increasing ROM, functional mobility as indicated by patients Functional Deficits. [x] Progressing: [] Met: [] Not Met: [] Adjusted  4. Patient will be able to mow the lawn without increased symptoms or restriction. [x] Progressing: [] Met: [] Not Met: [] Adjusted  5. Patient will be able to ride his bike for 30 minutes without restriction or the onset of right knee pain. [x] Progressing: [] Met: [] Not Met: [] Adjusted    Progression Towards Functional goals:  [x] Patient is progressing as expected towards functional goals listed. [] Progression is slowed due to complexities listed. [] Progression has been slowed due to co-morbidities. [] Plan just implemented, too soon to assess goals progression  [] Other:         Overall Progression Towards Functional goals/ Treatment Progress Update:  [x] Patient is progressing as expected towards functional goals listed. [] Progression is slowed due to complexities/Impairments listed. [] Progression has been slowed due to co-morbidities. [] Plan just implemented, too soon to assess goals progression <30days   [] Goals require adjustment due to lack of progress  [] Patient is not progressing as expected and requires additional follow up with physician  [] Other    Prognosis for POC: [x] Good [] Fair  [] Poor      Patient requires continued skilled intervention: [x] Yes  [] No    Treatment/Activity Tolerance:  [x] Patient able to complete treatment  [] Patient limited by fatigue  [] Patient limited by pain    [] Patient limited by other medical complications  [] Other:     ASSESSMENT: Patient tolerated treatment well. Therapeutic exercise was progressed without adverse effect. He would benefit from skilled PT intervention to decrease right knee swelling, improve right knee ROM, increase right LE muscular endurance, and increase right LE strength in order to return to his prior level of function and activity without the onset of right knee pain.     Return to Play: (if applicable)   []  Stage 1: Intro to Strength   []  Stage 2: Return to Run and Strength   []  Stage 3: Return to Jump and Strength   []  Stage 4: Dynamic Strength and Agility   []  Stage 5: Sport Specific Training     []  Ready to Return to Play, Meets All Above Stages   []  Not Ready for Return to Sports   Comments:                               PLAN: 1-2x per week for 8 weeks (beginning 9/15/20, ending 11/10/20)  [x] Continue per plan of care [] Alter current plan (see comments above)  [] Plan of care initiated [] Hold pending MD visit [] Discharge      Electronically signed by:  Sumeet Kimble PT, DPT ([place] PT License #: [id number])    Note: If patient does not return for scheduled/ recommended follow up visits, this note will serve as a discharge from care along with most recent update on progress.

## 2020-09-28 ENCOUNTER — HOSPITAL ENCOUNTER (OUTPATIENT)
Dept: PHYSICAL THERAPY | Age: 70
Setting detail: THERAPIES SERIES
Discharge: HOME | End: 2020-09-28
Payer: MEDICARE

## 2020-09-28 ENCOUNTER — APPOINTMENT (OUTPATIENT)
Dept: PHYSICAL THERAPY | Age: 70
End: 2020-09-28
Payer: MEDICARE

## 2020-09-28 PROCEDURE — 97016 VASOPNEUMATIC DEVICE THERAPY: CPT

## 2020-09-28 PROCEDURE — 97140 MANUAL THERAPY 1/> REGIONS: CPT

## 2020-09-28 PROCEDURE — 97110 THERAPEUTIC EXERCISES: CPT

## 2020-09-28 NOTE — FLOWSHEET NOTE
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-YQ) - Plica syndrome of right knee; M17.11 (ICD-10-CM) - Primary osteoarthritis of right knee  Treatment Diagnosis: Right Knee Pain (M25.561); Right Knee Effusion (M25.461); Right Knee Stiffness (M25.661); Right Lower Extremity Weakness (G88.06)  Insurance/Certification information:  PT Insurance Information: [facility]  Physician Information:  Referring Practitioner: Corry Santos MD  Has the plan of care been signed (Y/N):        [x]  Yes  []  No     Date of Patient follow up with Physician: PRN      Is this a Progress Report:     []  Yes  [x]  No        If Yes:  Date Range for reporting period:  Beginnin/15/20  Ending:     Progress report will be due (10 Rx or 30 days whichever is less):        Recertification will be due (POC Duration  / 90 days whichever is less): 11/10/20       Visit # Insurance Allowable Requires auth   5 MED NEC    [x]no        []yes:     Functional Scale (LEFS):   9/15/20: 35% Disability (52/80)     Practice Pattern I: Bony or Soft Tissue Surgery      Therapy Diagnosis/Practice Pattern: I      Number of Comorbidities:  []0     [x]1-2    []3+    Latex Allergy:  [x]NO      []YES  Preferred Language for Healthcare:   [x]English       []other:      Pain level:  0/10     SUBJECTIVE:  Pt states that he biked 30 minutes twice since LV and did not have any pain. He is very pleased with his progress.  He Press - 12 reps - 3 sets - 1x daily - 3x weekly  Standard Plank - 1 reps - 3 sets - 30 sec hold - 1x daily - 7x weekly  Side Plank with Clam - 12 reps - 3 sets - 1x daily - 7x weekly  Seated Table Hamstring Stretch - 15 reps - 3 sets - 3 sec Hold - 3x daily - 7x weekly  Gastroc Stretch on Wall - 15 reps - 3 sets - 3 sec hold - 3x daily - 7x weekly      Therapeutic Exercise and NMR EXR  [x] (36898) Provided verbal/tactile cueing for activities related to strengthening, flexibility, endurance, ROM for improvements in LE, proximal hip, and core control with self care, mobility, lifting, ambulation.  [] (09694) Provided verbal/tactile cueing for activities related to improving balance, coordination, kinesthetic sense, posture, motor skill, proprioception  to assist with LE, proximal hip, and core control in self care, mobility, lifting, ambulation and eccentric single leg control.      NMR and Therapeutic Activities:    [] (63902 or 22442) Provided verbal/tactile cueing for activities related to improving balance, coordination, kinesthetic sense, posture, motor skill, proprioception and motor activation to allow for proper function of core, proximal hip and LE with self care and ADLs  [] (73278) Gait Re-education- Provided training and instruction to the patient for proper LE, core and proximal hip recruitment and positioning and eccentric body weight control with ambulation re-education including up and down stairs     Home Exercise Program:    [x] (04613) Reviewed/Progressed HEP activities related to strengthening, flexibility, endurance, ROM of core, proximal hip and LE for functional self-care, mobility, lifting and ambulation/stair navigation   [] (30905)Reviewed/Progressed HEP activities related to improving balance, coordination, kinesthetic sense, posture, motor skill, proprioception of core, proximal hip and LE for self care, mobility, lifting, and ambulation/stair navigation      Manual Treatments:  PROM / STM / Oscillations-Mobs:  G-I, II, III, IV (PA's, Inf., Post.)  [x] (63648) Provided manual therapy to mobilize LE, proximal hip and/or LS spine soft tissue/joints for the purpose of modulating pain, promoting relaxation,  increasing ROM, reducing/eliminating soft tissue swelling/inflammation/restriction, improving soft tissue extensibility and allowing for proper ROM for normal function with self care, mobility, lifting and ambulation. Modalities:     [x] GAME READY (VASO)- for significant edema, swelling, pain control. Charges:  Timed Code Treatment Minutes: 45   Total Treatment Minutes: 66 (GR, indep ex)     Time In: 9:15  Time Out: 10:21am      [] EVAL (LOW) 88792 (typically 20 minutes face-to-face)  [] EVAL (MOD) 56877 (typically 30 minutes face-to-face)  [] EVAL (HIGH) 96682 (typically 45 minutes face-to-face)  [] RE-EVAL     [x] TO(63572) x   2  [] IONTO  [] NMR (01064) x     [x] VASO  [x] Manual (00813) x 1     [] Other:  [] TA x      [] Mech Traction (36936)  [] ES(attended) (54225)      [] ES (un) (39110):       GOALS:  Patient stated goal: Patient will be able to walk and play 9 holes of gold without restriction or the onset of right knee pain. [x] Progressing: [] Met: [] Not Met: [] Adjusted    Therapist goals for Patient:   Short Term Goals: To be achieved in: 2 weeks  1. Independent in HEP and progression per patient tolerance, in order to prevent re-injury. [] Progressing: [x] Met: [] Not Met: [] Adjusted  2. Patient will have a decrease in pain to facilitate improvement in movement, function, and ADLs as indicated by Functional Deficits. [] Progressing: [x] Met: [] Not Met: [] Adjusted    Long Term Goals: To be achieved in:  weeks  1. Disability index score of 18% or less for the LEFS to assist with reaching prior level of function. [x] Progressing: [] Met: [] Not Met: [] Adjusted  2.  Patient will demonstrate ROM on affected side equal to the unaffected side to allow for proper joint intervention to decrease right knee swelling, improve right knee ROM, increase right LE muscular endurance, and increase right LE strength in order to return to his prior level of function and activity without the onset of right knee pain. Return to Play: (if applicable)   []  Stage 1: Intro to Strength   []  Stage 2: Return to Run and Strength   []  Stage 3: Return to Jump and Strength   []  Stage 4: Dynamic Strength and Agility   []  Stage 5: Sport Specific Training     []  Ready to Return to Play, Meets All Above Stages   []  Not Ready for Return to Sports   Comments:                               PLAN: 1-2x per week for 8 weeks (beginning 9/15/20, ending 11/10/20)  [x] Continue per plan of care [] Alter current plan (see comments above)  [] Plan of care initiated [] Hold pending MD visit [] Discharge      Electronically signed by:  Codie Rahman, PT, DPT    Note: If patient does not return for scheduled/ recommended follow up visits, this note will serve as a discharge from care along with most recent update on progress.

## 2020-09-30 ENCOUNTER — HOSPITAL ENCOUNTER (OUTPATIENT)
Dept: PHYSICAL THERAPY | Age: 70
Setting detail: THERAPIES SERIES
Discharge: HOME | End: 2020-09-30
Payer: MEDICARE

## 2020-09-30 PROCEDURE — 97140 MANUAL THERAPY 1/> REGIONS: CPT

## 2020-09-30 PROCEDURE — 97110 THERAPEUTIC EXERCISES: CPT

## 2020-09-30 NOTE — FLOWSHEET NOTE
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; M17.11 (ICD-10-CM) - Primary osteoarthritis of right knee  Treatment Diagnosis: Right Knee Pain (M25.561); Right Knee Effusion (M25.461); Right Knee Stiffness (M25.661); Right Lower Extremity Weakness (T80.23)  Insurance/Certification information:  PT Insurance Information: [facility]  Physician Information:  Referring Practitioner: Alex Villareal MD  Has the plan of care been signed (Y/N):        [x]  Yes  []  No     Date of Patient follow up with Physician: PRN      Is this a Progress Report:     []  Yes  [x]  No        If Yes:  Date Range for reporting period:  Beginnin/15/20  Ending:     Progress report will be due (10 Rx or 30 days whichever is less):        Recertification will be due (POC Duration  / 90 days whichever is less): 11/10/20       Visit # Insurance Allowable Requires auth   6 MED NEC    [x]no        []yes:     Functional Scale (LEFS):   9/15/20: 35% Disability (52/80)     Practice Pattern I: Bony or Soft Tissue Surgery      Therapy Diagnosis/Practice Pattern: I      Number of Comorbidities:  []0     [x]1-2    []3+    Latex Allergy:  [x]NO      []YES  Preferred Language for Healthcare:   [x]English       []other:      Pain level:  0/10     SUBJECTIVE:  Patient went ot the driving range and hit around 50 balls yesterday, hitting wedge through 3 iron without issue.  He plans to go on a 30 minute bike ride again today. He notes he has not been able to ride his bike without his right knee swelling for quite some time. OBJECTIVE:    Right knee Active Extesnion: -3 // -2   Left knee Active Flexion: 145 // 149      RESTRICTIONS/PRECAUTIONS: WBAT    Exercises/Interventions:     Therapeutic Ex (55035) Volume Notes/CUES   Leg Press 3 min, 70#    Circuit (2x)  -SL RDL  -Step Ups  -Lateral Lunges   12x bilaterally  12x bilaterally, 8\" step  12x bilaterally    Eccentric Leg Press 30x12 on right, 70# 2 up/ 1 down on right, 30 sec res between sets                                      Manual Intervention (50091) 8 min    STM to right hamstring,calf, patellar tendon, and surgical ports     Grade III-IV superior and inferior glides fo the right patella                    NMR re-education (36507)  CUES NEEDED                                                Therapeutic Activity (44702)                                   Additional time was spent explaining exercise instruction, exercise set up, and rest breaks. Patient Education:   · Continue current HEP. Provided gray therabands for x-walks and anti-rotation presses. · Do not swim yet as right lateral surgical port still has an area of scabbing. · Attempt to play 9 holes prior to next visit on 10/5/20. · Attempt to mow a portion of the lawn prior to next visit on 10/5/20. · Focus more on closed-kinetic chain exercises such as leg press, SL RDL, step ups, etc., versus leg extension in the future following PT. Access Code: [email or web address]   URL: [email or web address]   Date: 09/21/2020   Prepared by: Rd Pulido     Exercises  Long Sitting Quad Set - 1 reps - 1 sets - 3 sec hold - 6 minutes Time - 3x daily - 7x weekly  Sitting Heel Slide with Towel - 1 sets - 1 sec hold - 6 minutes Time - 3x daily - 7x weekly  Forward T - 12 reps - 3 sets - 1x daily - 3x weekly  X Band Walk - 12 reps - 3 sets - 1x daily - 3x weekly  Squatting Anti-Rotation / Oscillations-Mobs:  G-I, II, III, IV (PA's, Inf., Post.)  [x] (08525) Provided manual therapy to mobilize LE, proximal hip and/or LS spine soft tissue/joints for the purpose of modulating pain, promoting relaxation,  increasing ROM, reducing/eliminating soft tissue swelling/inflammation/restriction, improving soft tissue extensibility and allowing for proper ROM for normal function with self care, mobility, lifting and ambulation. Modalities:     [] GAME READY (VASO)- for significant edema, swelling, pain control. Charges:  Timed Code Treatment Minutes: 42   Total Treatment Minutes: 42     Time In: 9:45am  Time Out: 10:27am      [] EVAL (LOW) 23418 (typically 20 minutes face-to-face)  [] EVAL (MOD) 11695 (typically 30 minutes face-to-face)  [] EVAL (HIGH) 00412 (typically 45 minutes face-to-face)  [] RE-EVAL     [x] IK(57128) x   2  [] IONTO  [] NMR (67033) x     [] VASO  [x] Manual (15326) x 1     [] Other:  [] TA x      [] Mech Traction (60238)  [] ES(attended) (11934)      [] ES (un) (57542):       GOALS:  Patient stated goal: Patient will be able to walk and play 9 holes of gold without restriction or the onset of right knee pain. [x] Progressing: [] Met: [] Not Met: [] Adjusted    Therapist goals for Patient:   Short Term Goals: To be achieved in: 2 weeks  1. Independent in HEP and progression per patient tolerance, in order to prevent re-injury. [] Progressing: [x] Met: [] Not Met: [] Adjusted  2. Patient will have a decrease in pain to facilitate improvement in movement, function, and ADLs as indicated by Functional Deficits. [] Progressing: [x] Met: [] Not Met: [] Adjusted    Long Term Goals: To be achieved in:  weeks  1. Disability index score of 18% or less for the LEFS to assist with reaching prior level of function. [x] Progressing: [] Met: [] Not Met: [] Adjusted  2.  Patient will demonstrate ROM on affected side equal to the unaffected side to allow for proper joint functioning as indicated by patients Functional Deficits. [x] Progressing: [] Met: [] Not Met: [] Adjusted  3. Patient will demonstrate an increase in Strength to good proximal hip strength and control, within 5lb HHD in LE to allow for proper functional mobility as indicated by patients Functional Deficits. [x] Progressing: [] Met: [] Not Met: [] Adjusted  4. Patient will be able to mow the lawn without increased symptoms or restriction. [x] Progressing: [] Met: [] Not Met: [] Adjusted  5. Patient will be able to ride his bike for 30 minutes without restriction or the onset of right knee pain. [] Progressing: [x] Met: [] Not Met: [] Adjusted    Progression Towards Functional goals:  [x] Patient is progressing as expected towards functional goals listed. [] Progression is slowed due to complexities listed. [] Progression has been slowed due to co-morbidities. [] Plan just implemented, too soon to assess goals progression  [] Other:         Overall Progression Towards Functional goals/ Treatment Progress Update:  [x] Patient is progressing as expected towards functional goals listed. [] Progression is slowed due to complexities/Impairments listed. [] Progression has been slowed due to co-morbidities. [] Plan just implemented, too soon to assess goals progression <30days   [] Goals require adjustment due to lack of progress  [] Patient is not progressing as expected and requires additional follow up with physician  [] Other    Prognosis for POC: [x] Good [] Fair  [] Poor      Patient requires continued skilled intervention: [x] Yes  [] No    Treatment/Activity Tolerance:  [x] Patient able to complete treatment  [] Patient limited by fatigue  [] Patient limited by pain    [] Patient limited by other medical complications  [] Other:     ASSESSMENT: Patient tolerated treatment well. Weightbearing resistance activity was progressed today without adverse effect.  He has been instructed to attempt several activities representing his functional goals between now and his net appointment on 10/5/20. He would benefit from skilled PT intervention to decrease right knee swelling, improve right knee ROM, increase right LE muscular endurance, and increase right LE strength in order to return to his prior level of function and activity without the onset of right knee pain. Return to Play: (if applicable)   []  Stage 1: Intro to Strength   []  Stage 2: Return to Run and Strength   []  Stage 3: Return to Jump and Strength   []  Stage 4: Dynamic Strength and Agility   []  Stage 5: Sport Specific Training     []  Ready to Return to Play, Meets All Above Stages   []  Not Ready for Return to Sports   Comments:                               PLAN: 1-2x per week for 8 weeks (beginning 9/15/20, ending 11/10/20)  [x] Continue per plan of care [] Alter current plan (see comments above)  [] Plan of care initiated [] Hold pending MD visit [] Discharge      Electronically signed by:  Marisela Woodward PT, DPT ([place] PT License #: [id number])    Note: If patient does not return for scheduled/ recommended follow up visits, this note will serve as a discharge from care along with most recent update on progress.

## 2020-10-05 ENCOUNTER — HOSPITAL ENCOUNTER (OUTPATIENT)
Dept: PHYSICAL THERAPY | Age: 70
Setting detail: THERAPIES SERIES
Discharge: HOME | End: 2020-10-05
Payer: MEDICARE

## 2020-10-05 PROCEDURE — 97140 MANUAL THERAPY 1/> REGIONS: CPT

## 2020-10-05 PROCEDURE — 97110 THERAPEUTIC EXERCISES: CPT

## 2020-10-05 NOTE — FLOWSHEET NOTE
without issue. He moved the lawn for 60 minutes without issue. He rode his bike for 60 minutes without issue. He picked the last big of scab this morning and notes some redness as a result. He has not played golf yet    OBJECTIVE:    Right knee Active Extesnion: -2   Left knee Active Flexion: 147   Small amount of smeared blood at lateral surgical ports site. The patient was provided a bandaid. RESTRICTIONS/PRECAUTIONS: WBAT    Exercises/Interventions:     Therapeutic Ex (15604) Volume Notes/CUES   Leg Press 5 min, 70#    Circuit (2x)  -SL RDL  -Step Ups  -Lateral Lunges   12x bilaterally, 3#'s  12x bilaterally, 8\" step  12x bilaterally    Eccentric Leg Press 3x12 on right, 80# 2 up/ 1 down on right, 45 sec rest between sets                                      Manual Intervention (54206)                              NMR re-education (74362) 11 min [address] (3x)  -Foraward Lunge onto BOSU  -SLB on [place]   12x bilaterally  30\"                                            Therapeutic Activity (07109)                                   Additional time was spent explaining exercise instruction, exercise set up, and rest breaks. Patient Education:   · Continue current HEP. · Do not swim yet as right lateral surgical port still has an area of scabbing. · Attempt to play 9 holes prior to next visit on 10/5/20. Access Code: [email or web address]   URL: [email or web address]   Date: 09/21/2020   Prepared by: Jodi Hernández     Exercises  Long Sitting Quad Set - 1 reps - 1 sets - 3 sec hold - 6 minutes Time - 3x daily - 7x weekly  Sitting Heel Slide with Towel - 1 sets - 1 sec hold - 6 minutes Time - 3x daily - 7x weekly  Forward T - 12 reps - 3 sets - 1x daily - 3x weekly  X Band Walk - 12 reps - 3 sets - 1x daily - 3x weekly  Squatting Anti-Rotation Press - 12 reps - 3 sets - 1x daily - 3x weekly  Standard Plank - 1 reps - 3 sets - 30 sec hold - 1x daily - 7x weekly  Side Plank with Clam - 12 reps - 3 sets - 1x daily - 7x weekly  Seated Table Hamstring Stretch - 15 reps - 3 sets - 3 sec Hold - 3x daily - 7x weekly  Gastroc Stretch on Wall - 15 reps - 3 sets - 3 sec hold - 3x daily - 7x weekly      Therapeutic Exercise and NMR EXR  [x] (70403) Provided verbal/tactile cueing for activities related to strengthening, flexibility, endurance, ROM for improvements in LE, proximal hip, and core control with self care, mobility, lifting, ambulation.  [] (40747) Provided verbal/tactile cueing for activities related to improving balance, coordination, kinesthetic sense, posture, motor skill, proprioception  to assist with LE, proximal hip, and core control in self care, mobility, lifting, ambulation and eccentric single leg control.      NMR and Therapeutic Activities:    [] (84819 or 75113) Provided verbal/tactile cueing for activities related to improving balance, coordination, kinesthetic sense, posture, motor skill, proprioception and motor activation to allow for proper function of core, proximal hip and LE with self care and ADLs  [] (01436) Gait Re-education- Provided training and instruction to the patient for proper LE, core and proximal hip recruitment and positioning and eccentric body weight control with ambulation re-education including up and down stairs     Home Exercise Program:    [x] (00532) Reviewed/Progressed HEP activities related to strengthening, flexibility, endurance, ROM of core, proximal hip and LE for functional self-care, mobility, lifting and ambulation/stair navigation   [] (26947)Reviewed/Progressed HEP activities related to improving balance, coordination, kinesthetic sense, posture, motor skill, proprioception of core, proximal hip and LE for self care, mobility, lifting, and ambulation/stair navigation      Manual Treatments:  PROM / STM / Oscillations-Mobs:  G-I, II, III, IV (PA's, Inf., Post.)  [x] (75960) Provided manual therapy to mobilize LE, proximal hip and/or LS spine soft tissue/joints for the purpose of modulating pain, promoting relaxation,  increasing ROM, reducing/eliminating soft tissue swelling/inflammation/restriction, improving soft tissue extensibility and allowing for proper ROM for normal function with self care, mobility, lifting and ambulation. Modalities:     [] GAME READY (VASO)- for significant edema, swelling, pain control. Charges:  Timed Code Treatment Minutes: 58   Total Treatment Minutes: 58     Time In: 11:21am  Time Out: 12:19m      [] EVAL (LOW) 93351 (typically 20 minutes face-to-face)  [] EVAL (MOD) 40387 (typically 30 minutes face-to-face)  [] EVAL (HIGH) 49037 (typically 45 minutes face-to-face)  [] RE-EVAL     [x] XT(07768) x   3  [] IONTO  [x] NMR (03760) x 1    [] VASO  [] Manual (65409) x      [] Other:  [] TA x      [] Mech Traction (62607)  [] ES(attended) (04860)      [] ES (un) (84560):       GOALS:  Patient stated goal: Patient will be able to walk and play 9 holes of gold without restriction or the onset of right knee pain. [x] Progressing: [] Met: [] Not Met: [] Adjusted    Therapist goals for Patient:   Short Term Goals: To be achieved in: 2 weeks  1. Independent in HEP and progression per patient tolerance, in order to prevent re-injury. [] Progressing: [x] Met: [] Not Met: [] Adjusted  2. Patient will have a decrease in pain to facilitate improvement in movement, function, and ADLs as indicated by Functional Deficits. [] Progressing: [x] Met: [] Not Met: [] Adjusted    Long Term Goals: To be achieved in:  weeks  1. Disability index score of 18% or less for the LEFS to assist with reaching prior level of function. [x] Progressing: [] Met: [] Not Met: [] Adjusted  2. Patient will demonstrate ROM on affected side equal to the unaffected side to allow for proper joint functioning as indicated by patients Functional Deficits. [] Progressing: [x] Met: [] Not Met: [] Adjusted  3.  Patient will demonstrate an increase in Strength to good proximal hip strength and control, within 5lb HHD in LE to allow for proper functional mobility as indicated by patients Functional Deficits. [x] Progressing: [] Met: [] Not Met: [] Adjusted  4. Patient will be able to mow the lawn without increased symptoms or restriction. [] Progressing: [x] Met: [] Not Met: [] Adjusted  5. Patient will be able to ride his bike for 30 minutes without restriction or the onset of right knee pain. [] Progressing: [x] Met: [] Not Met: [] Adjusted    Progression Towards Functional goals:  [x] Patient is progressing as expected towards functional goals listed. [] Progression is slowed due to complexities listed. [] Progression has been slowed due to co-morbidities. [] Plan just implemented, too soon to assess goals progression  [] Other:         Overall Progression Towards Functional goals/ Treatment Progress Update:  [x] Patient is progressing as expected towards functional goals listed. [] Progression is slowed due to complexities/Impairments listed. [] Progression has been slowed due to co-morbidities. [] Plan just implemented, too soon to assess goals progression <30days   [] Goals require adjustment due to lack of progress  [] Patient is not progressing as expected and requires additional follow up with physician  [] Other    Prognosis for POC: [x] Good [] Fair  [] Poor      Patient requires continued skilled intervention: [x] Yes  [] No    Treatment/Activity Tolerance:  [x] Patient able to complete treatment  [] Patient limited by fatigue  [] Patient limited by pain    [] Patient limited by other medical complications  [] Other:     ASSESSMENT: Patient tolerated treatment well. Weightbearing resistance activity was progressed today without adverse effect.  Playing golf, which is a primary form of exercise, is his primary remaining functional goal. He would benefit from skilled PT intervention to decrease right knee swelling, improve right knee ROM, increase right LE muscular endurance, and increase right LE strength in order to return to his prior level of function and activity without the onset of right knee pain. Return to Play: (if applicable)   []  Stage 1: Intro to Strength   []  Stage 2: Return to Run and Strength   []  Stage 3: Return to Jump and Strength   []  Stage 4: Dynamic Strength and Agility   []  Stage 5: Sport Specific Training     []  Ready to Return to Play, Meets All Above Stages   []  Not Ready for Return to Sports   Comments:                               PLAN: 1-2x per week for 8 weeks (beginning 9/15/20, ending 11/10/20)  [x] Continue per plan of care [] Alter current plan (see comments above)  [] Plan of care initiated [] Hold pending MD visit [] Discharge      Electronically signed by:  Shira Mcgee PT, DPT ([place] PT License #: [id number])      Note: If patient does not return for scheduled/ recommended follow up visits, this note will serve as a discharge from care along with most recent update on progress.

## 2020-10-07 ENCOUNTER — APPOINTMENT (OUTPATIENT)
Dept: PHYSICAL THERAPY | Age: 70
End: 2020-10-07
Payer: MEDICARE

## 2020-10-08 ENCOUNTER — HOSPITAL ENCOUNTER (OUTPATIENT)
Dept: PHYSICAL THERAPY | Age: 70
Setting detail: THERAPIES SERIES
Discharge: HOME | End: 2020-10-08
Payer: MEDICARE

## 2020-10-08 PROCEDURE — 97164 PT RE-EVAL EST PLAN CARE: CPT

## 2020-10-08 PROCEDURE — 97110 THERAPEUTIC EXERCISES: CPT

## 2020-10-08 NOTE — FLOWSHEET NOTE
[facility],  [address] [place]  Phone: [phone]  Fax [phone]    Physical Therapy Treatment Note/ Progress Report:           Date:  10/8/2020    Patient Name:  Donovan Chung    :  1950  MRN: [id number]  Restrictions/Precautions:    Medical/Treatment Diagnosis Information:  Diagnosis: H17.578 (ICD-10-CM) - Complex tear of medial meniscus of right knee as current injury; S83.271 (ICD-10-CM) - Complex tear of lateral meniscus of right knee as current injury; D75.78 (HMB-91-PE) - Plica syndrome of right knee; M17.11 (ICD-10-CM) - Primary osteoarthritis of right knee  Treatment Diagnosis: Right Knee Pain (M25.561); Right Knee Effusion (M25.461); Right Knee Stiffness (M25.661); Right Lower Extremity Weakness (V13.11)  Insurance/Certification information:  PT Insurance Information: [facility]  Physician Information:  Referring Practitioner: Froilan Dudley MD  Has the plan of care been signed (Y/N):        [x]  Yes  []  No     Date of Patient follow up with Physician: PRN      Is this a Progress Report:     [x]  Yes  []  No        If Yes:  Date Range for reporting period:  Beginnin/15/20  Ending:  10/8/20    Progress report will be due (10 Rx or 30 days whichever is less):  N/A      Recertification will be due (POC Duration  / 90 days whichever is less): N/A       Visit # Insurance Allowable Requires auth   8 MED NEC    [x]no        []yes:     Functional Scale (LEFS):   9/15/20: 35% Disability (52/80)  10/8/20: 8.75%% Disability (73/80)     Practice Pattern I: Bony or Soft Tissue Surgery      Therapy Diagnosis/Practice Pattern: I      Number of Comorbidities:  []0     [x]1-2    []3+    Latex Allergy:  [x]NO      []YES  Preferred Language for Healthcare:   [x]English       []other:      Pain level:  0/10     SUBJECTIVE:  Patient played 18 holes using a cart and used his skiers edge at home for five minutes. Neither activity caused pain. He notes his accidentally scraped his lateral port site. OBJECTIVE:    Right knee Active Extesnion: -2   Left knee Active Flexion: 145   Small, superficial abbrasion of leteral port site. No signs of infection noted. RESTRICTIONS/PRECAUTIONS: WBAT    Exercises/Interventions:     Therapeutic Ex (92834) Volume Notes/CUES   Leg Press 5 min, 80#    Circuit (3x)  -SL RDL  -Walking Lunges w/Eccentric Drop  -X-Walks   12x bilaterally: Bw, 5#'s 5#'s  6x bilaterally  12x bilaterally, 1/2-inch red    Eccentric Leg Press 3x12 on right, 90# 2 up/ 1 down on right, 30 sec rest between sets                                      Manual Intervention (97952)                              NMR re-education (35653)  CUES NEEDED                                                Therapeutic Activity (92574)                                   Additional time was spent explaining exercise instruction, exercise set up, and rest breaks. Patient Education:   · Continue current HEP 1-2x per week for supplemental training. Access Code: [email or web address]   URL: Public Solution/   Date: 09/21/2020   Prepared by: Finesse Gomez     Exercises  Long Sitting Quad Set - 1 reps - 1 sets - 3 sec hold - 6 minutes Time - 3x daily - 7x weekly  Sitting Heel Slide with Towel - 1 sets - 1 sec hold - 6 minutes Time - 3x daily - 7x weekly  Forward T - 12 reps - 3 sets - 1x daily - 3x weekly  X Band Walk - 12 reps - 3 sets - 1x daily - 3x weekly  Squatting Anti-Rotation Press - 12 reps - 3 sets - 1x daily - 3x weekly  Standard Plank - 1 reps - 3 sets - 30 sec hold - 1x daily - 7x weekly  Side Plank with Clam - 12 reps - 3 sets - 1x daily - 7x weekly  Seated Table Hamstring Stretch - 15 reps - 3 sets - 3 sec Hold - 3x daily - 7x weekly  Gastroc Stretch on Wall - 15 reps - 3 sets - 3 sec hold - 3x daily - 7x weekly      Therapeutic Exercise and NMR EXR  [x] (56105) Provided verbal/tactile cueing for activities related to strengthening, flexibility, endurance, ROM for improvements in LE, proximal hip, and core control with self care, mobility, lifting, ambulation.  [] (73735) Provided verbal/tactile cueing for activities related to improving balance, coordination, kinesthetic sense, posture, motor skill, proprioception  to assist with LE, proximal hip, and core control in self care, mobility, lifting, ambulation and eccentric single leg control. NMR and Therapeutic Activities:    [] (10542 or 83529) Provided verbal/tactile cueing for activities related to improving balance, coordination, kinesthetic sense, posture, motor skill, proprioception and motor activation to allow for proper function of core, proximal hip and LE with self care and ADLs  [] (35149) Gait Re-education- Provided training and instruction to the patient for proper LE, core and proximal hip recruitment and positioning and eccentric body weight control with ambulation re-education including up and down stairs     Home Exercise Program:    [x] (96391) Reviewed/Progressed HEP activities related to strengthening, flexibility, endurance, ROM of core, proximal hip and LE for functional self-care, mobility, lifting and ambulation/stair navigation   [] (21920)Reviewed/Progressed HEP activities related to improving balance, coordination, kinesthetic sense, posture, motor skill, proprioception of core, proximal hip and LE for self care, mobility, lifting, and ambulation/stair navigation      Manual Treatments:  PROM / STM / Oscillations-Mobs:  G-I, II, III, IV (PA's, Inf., Post.)  [x] (16104) Provided manual therapy to mobilize LE, proximal hip and/or LS spine soft tissue/joints for the purpose of modulating pain, promoting relaxation,  increasing ROM, reducing/eliminating soft tissue swelling/inflammation/restriction, improving soft tissue extensibility and allowing for proper ROM for normal function with self care, mobility, lifting and ambulation.      Modalities: [] GAME READY (VASO)- for significant edema, swelling, pain control. Charges:  Timed Code Treatment Minutes: 35   Total Treatment Minutes: 45     Time In: 2:31pm  Time Out: 3:16pm      [] EVAL (LOW) 95083 (typically 20 minutes face-to-face)  [] EVAL (MOD) 21928 (typically 30 minutes face-to-face)  [] EVAL (HIGH) 03023 (typically 45 minutes face-to-face)  [x] RE-EVAL     [x] XX(41352) x   2  [] IONTO  [] NMR (41699) x     [] VASO  [] Manual (50978) x      [] Other:  [] TA x      [] Mech Traction (72059)  [] ES(attended) (97614)      [] ES (un) (71826):       GOALS:  Patient stated goal: Patient will be able to walk and play 9 holes of gold without restriction or the onset of right knee pain. [] Progressing: [x] Met: [] Not Met: [] Adjusted    Therapist goals for Patient:   Short Term Goals: To be achieved in: 2 weeks  1. Independent in HEP and progression per patient tolerance, in order to prevent re-injury. [] Progressing: [x] Met: [] Not Met: [] Adjusted  2. Patient will have a decrease in pain to facilitate improvement in movement, function, and ADLs as indicated by Functional Deficits. [] Progressing: [x] Met: [] Not Met: [] Adjusted    Long Term Goals: To be achieved in:  weeks  1. Disability index score of 18% or less for the LEFS to assist with reaching prior level of function. [] Progressing: [x] Met: [] Not Met: [] Adjusted  2. Patient will demonstrate ROM on affected side equal to the unaffected side to allow for proper joint functioning as indicated by patients Functional Deficits. [] Progressing: [x] Met: [] Not Met: [] Adjusted  3. Patient will demonstrate an increase in Strength to good proximal hip strength and control, within 5lb HHD in LE to allow for proper functional mobility as indicated by patients Functional Deficits. [] Progressing: [x] Met: [] Not Met: [] Adjusted  4. Patient will be able to mow the lawn without increased symptoms or restriction.    [] Progressing: [x] Met: [] Not Met: [] Adjusted  5. Patient will be able to ride his bike for 30 minutes without restriction or the onset of right knee pain. [] Progressing: [x] Met: [] Not Met: [] Adjusted    Progression Towards Functional goals:  [x] Patient is progressing as expected towards functional goals listed. [] Progression is slowed due to complexities listed. [] Progression has been slowed due to co-morbidities. [] Plan just implemented, too soon to assess goals progression  [] Other:         Overall Progression Towards Functional goals/ Treatment Progress Update:  [x] Patient is progressing as expected towards functional goals listed. [] Progression is slowed due to complexities/Impairments listed. [] Progression has been slowed due to co-morbidities. [] Plan just implemented, too soon to assess goals progression <30days   [] Goals require adjustment due to lack of progress  [] Patient is not progressing as expected and requires additional follow up with physician  [] Other    Prognosis for POC: [x] Good [] Fair  [] Poor      Patient requires continued skilled intervention: [x] Yes  [] No    Treatment/Activity Tolerance:  [x] Patient able to complete treatment  [] Patient limited by fatigue  [] Patient limited by pain    [] Patient limited by other medical complications  [] Other:     ASSESSMENT: Patient has done very well in PT, achieving all his goals. His is appropriate to discharge to his [facility].     Return to Play: (if applicable)   []  Stage 1: Intro to Strength   []  Stage 2: Return to Run and Strength   []  Stage 3: Return to Jump and Strength   []  Stage 4: Dynamic Strength and Agility   []  Stage 5: Sport Specific Training     []  Ready to Return to Play, Meets All Above Stages   []  Not Ready for Return to Sports   Comments:                               PLAN:   [x] Continue per plan of care [] Alter current plan (see comments above)  [] Plan of care initiated [] Hold pending MD visit [x]

## 2020-10-08 NOTE — DISCHARGE SUMMARY
goals were achieved (#'s):   [] The following goals were not achieved for the following reasons:/assessmen of improvement as it relates to each goal:    Plan of Care:  [x] Discharge from Therapy Services due to: Good Progress    Reason for Discharge:   [x] All goals achieved    [] Patient having surgery  [] Physician discontinued therapy  [] Insurance/Financial Limitations [] Patient did not return for follow up visits [] Home program/1 visit only   [] No subjective or objective improvement [] Plateaued   [] Patient was unable to adhere to the plan of care established at evaluation. [] Referred back to physician for re-evaluation and did not return.      [] Other:       Electronically signed by:   Finesse Gomez PT, DPT ([place] PT License #: [id number])

## 2020-11-10 ENCOUNTER — TELEPHONE (OUTPATIENT)
Dept: ORTHOPEDIC SURGERY | Age: 70
End: 2020-11-10

## 2020-11-11 ENCOUNTER — TELEPHONE (OUTPATIENT)
Dept: ORTHOPEDIC SURGERY | Age: 70
End: 2020-11-11

## 2020-11-11 NOTE — TELEPHONE ENCOUNTER
Tried to call patient back. Voice mail is full. Unable to leave a message. Please schedule him an appointment if he calls. He wants to get Synvisc injections. Medicare does not require prior auth.

## 2020-11-19 ENCOUNTER — NURSE ONLY (OUTPATIENT)
Dept: ORTHOPEDIC SURGERY | Age: 70
End: 2020-11-19
Payer: MEDICARE

## 2020-11-19 RX ORDER — HYALURONATE SODIUM 10 MG/ML
20 SYRINGE (ML) INTRAARTICULAR ONCE
Status: COMPLETED | OUTPATIENT
Start: 2020-11-19 | End: 2020-11-19

## 2020-11-19 RX ADMIN — Medication 20 MG: at 14:05

## 2020-11-25 ENCOUNTER — NURSE ONLY (OUTPATIENT)
Dept: ORTHOPEDIC SURGERY | Age: 70
End: 2020-11-25
Payer: MEDICARE

## 2020-11-25 RX ORDER — HYALURONATE SODIUM 10 MG/ML
20 SYRINGE (ML) INTRAARTICULAR ONCE
Status: COMPLETED | OUTPATIENT
Start: 2020-11-25 | End: 2020-11-25

## 2020-11-25 RX ADMIN — Medication 20 MG: at 14:22

## 2020-12-03 ENCOUNTER — NURSE ONLY (OUTPATIENT)
Dept: ORTHOPEDIC SURGERY | Age: 70
End: 2020-12-03
Payer: MEDICARE

## 2020-12-03 RX ORDER — HYALURONATE SODIUM 10 MG/ML
20 SYRINGE (ML) INTRAARTICULAR ONCE
Status: COMPLETED | OUTPATIENT
Start: 2020-12-03 | End: 2020-12-03

## 2020-12-03 RX ADMIN — Medication 20 MG: at 14:11

## 2020-12-03 NOTE — PROGRESS NOTES
Diagnosis: Right knee osteoarthritis    Procedure: Right knee Visco supplementation injection #3    The patient returns today for their third and final Euflexxa injection in the right knee. The risks, benefits, and complications of the injections were again discussed in detail with the patient. The risks discussed include but are not limited to infection, skin reactions, hot swollen joints, and anaphylaxis. The patient gave verbal informed consent for the injection. The patient's skin was prepped with 3 sterile gauze pads soaked with alcohol solution and the knee joint was injected with 2 mL of Euflexxa intra-articularly under sterile conditions. Technique: Under sterile conditions a SonFigment ultrasound unit with a variable frequency (6.0-15.0 MHz) linear transducer was used to localize the placement of a 22-gauge needle into the knee joint. Findings: Successful needle placement for intra-articular Visco supplementation injection. Final images were taken and saved for permanent record. The patient tolerated the injection reasonably well. The patient was given instructions to ice of the knee and avoid strenuous activity for 24-48 hours. The patient was instructed to call the office immediately if there is increased pain, redness, warmth, fever, or chills. We will see the patient back on an as-needed basis  from this point.

## 2024-09-27 LAB
ANION GAP SERPL CALCULATED.3IONS-SCNC: 9 MMOL/L (ref 5–13)
APTT: 31.8 SECONDS (ref 23.1–37.6)
BILIRUBIN, URINE: NEGATIVE
BLOOD, URINE: NEGATIVE
BUN / CREAT RATIO: 15
BUN BLDV-MCNC: 16 MG/DL (ref 7–25)
CALCIUM SERPL-MCNC: 9.4 MG/DL (ref 8.5–10.5)
CHLORIDE BLD-SCNC: 107 MMOL/L (ref 98–110)
CLARITY, UA: CLEAR
CO2: 24 MMOL/L (ref 22–29)
COAGULATION TISSUE FACTOR INDUCED BLD TIME PT. COAG: 12.2 SECONDS (ref 10.5–14.1)
COLOR, UA: YELLOW
CREAT SERPL-MCNC: 1.04 MG/DL (ref 0.5–1.3)
EGFR (CKD-EPI): 75 SEE NOTE
GLUCOSE BLD-MCNC: 86 MG/DL (ref 71–99)
GLUCOSE URINE: NEGATIVE MG/DL
HCT VFR BLD CALC: 43.3 % (ref 40–51)
HEMOGLOBIN: 14.2 G/DL (ref 13.2–17.1)
INR BLD: 1 (ref 0.9–1.1)
KETONES, URINE: NEGATIVE MG/DL
LEUKOCYTE ESTERASE, URINE: NEGATIVE
LEUKOCYTES, BLD: 6.43 10*3/UL (ref 3.8–10.8)
MCH RBC QN AUTO: 31.6 PG (ref 27–33)
MCHC RBC AUTO-ENTMCNC: 32.8 G/DL (ref 30–36)
MCV RBC AUTO: 96.4 FL (ref 80–100)
NITRITE, URINE: NEGATIVE
PDW BLD-RTO: 13.1 % (ref 11–15)
PH, URINE: 6.5 (ref 5–8)
PLATELET # BLD: 155 10*3/UL (ref 140–400)
PMV BLD AUTO: 11.8 FL (ref 9–13)
POTASSIUM SERPL-SCNC: 4.3 MMOL/L (ref 3.5–5.1)
PROTEIN UA: NEGATIVE MG/DL
RBC # BLD: 4.49 10*6/UL (ref 4.2–5.8)
SODIUM BLD-SCNC: 140 MMOL/L (ref 135–146)
SPECIFIC GRAVITY UA: 1.02 (ref 1–1.03)
UROBILINOGEN, URINE: <2 MG/DL

## (undated) DEVICE — TUBE IRRIG L8IN LNG PT W/ CONN FOR PMP SYS REDEUCE

## (undated) DEVICE — PADDING CAST W6INXL4YD ST COT RAYON MICROPLEATED HIGHLY

## (undated) DEVICE — AMBIENT SUPER MULTIVAC 50 WITH                                    INTEGRATED FINGER SWITCHES IFS: Brand: COBLATION

## (undated) DEVICE — ZIMMER® STERILE DISPOSABLE TOURNIQUET CUFF WITH PLC, DUAL PORT, SINGLE BLADDER, 30 IN. (76 CM)

## (undated) DEVICE — GAUZE,SPONGE,4"X4",16PLY,STRL,LF,10/TRAY: Brand: MEDLINE

## (undated) DEVICE — BANDAGE COMPR W6INXL5YD HI E BGE W/ CLP SURE-WRAP

## (undated) DEVICE — GLOVE SURG SZ 8 L12IN FNGR THK94MIL STD WHT LTX FREE

## (undated) DEVICE — 3M™ TEGADERM™ TRANSPARENT FILM DRESSING FRAME STYLE, 1624W, 2-3/8 IN X 2-3/4 IN (6 CM X 7 CM), 100/CT 4CT/CASE: Brand: 3M™ TEGADERM™

## (undated) DEVICE — SET GRAV VENT NVENT CK VLV 3 NDL FREE PRT 10 GTT

## (undated) DEVICE — 4-PORT MANIFOLD: Brand: NEPTUNE 2

## (undated) DEVICE — GLOVE SURG SZ 85 L12IN FNGR THK94MIL STD WHT LTX FREE

## (undated) DEVICE — DRAPE 54X23IN MAYO STAND COVER REINF

## (undated) DEVICE — 3M™ STERI-STRIP™ COMPOUND BENZOIN TINCTURE 40 BAGS/CARTON 4 CARTONS/CASE C1544: Brand: 3M™ STERI-STRIP™

## (undated) DEVICE — 3.5 MM INCISOR STRAIGHT BLADES,                                    POWER/EP-1, MEDIUM GRAY, PACKAGED 6                                    PER BOX, STERILE

## (undated) DEVICE — PACK PROCEDURE SURG ARTHSCP CUST

## (undated) DEVICE — 3M™ STERI-STRIP™ REINFORCED ADHESIVE SKIN CLOSURES, R1547, 1/2 IN X 4 IN (12 MM X 100 MM), 6 STRIPS/ENVELOPE: Brand: 3M™ STERI-STRIP™

## (undated) DEVICE — PADDING CAST W4INXL4YD NONSTERILE COT RAYON MICROPLEATED

## (undated) DEVICE — CATHETER IV 20GA L1.25IN PNK FEP SFTY STR HUB RADPQ DISP

## (undated) DEVICE — SOLUTION IV 1000ML LAC RINGERS PH 6.5 INJ USP VIAFLX PLAS

## (undated) DEVICE — DRAPE ARTHRO 90X124IN KNEE SMS FAB EXT FLD COLL PCH RESIST

## (undated) DEVICE — SET ADMIN PRIMING 7ML L30IN 7.35LB 20 GTT 2ND RLER CLMP

## (undated) DEVICE — SUTURE MCRYL SZ 4-0 L18IN ABSRB UD L19MM PS-2 3/8 CIR PRIM Y496G

## (undated) DEVICE — GLOVE SURG SZ 75 L12IN FNGR THK94MIL STD WHT LTX FREE